# Patient Record
Sex: MALE | Race: WHITE | NOT HISPANIC OR LATINO | Employment: OTHER | URBAN - METROPOLITAN AREA
[De-identification: names, ages, dates, MRNs, and addresses within clinical notes are randomized per-mention and may not be internally consistent; named-entity substitution may affect disease eponyms.]

---
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
## 2018-01-10 NOTE — PROGRESS NOTES
Assessment    1  Well child visit (V20 2) (Z00 129)    Discussion/Summary    Impression:   No growth, development, elimination, feeding, skin and sleep concerns  no medical problems  Anticipatory guidance addressed as per the history of present illness section  No vaccines needed  No medication changes  Information discussed with patient and Parent/Guardian  History of Present Illness  GENNY, 12-18 years Male (Brief):   General Health: The child's health since the last visit is described as good  Dental hygiene: Good  Immunization status: Up to date  Caregiver concerns:   Caregivers deny concerns regarding nutrition, sleep, behavior, school, development and elimination  Nutrition/Elimination:   Diet:  his current diet is diverse and healthy  No elimination issues are expressed  Sleep:  No sleep issues are reported  Behavior: The child's temperament is described as calm and happy  No behavior issues identified  Health Risks:  No significant risk factors are identified  Childcare/School: The child stays home alone  He is in high school  School performance has been good  Sports Participation Questions: Active Problems    1  Acute pharyngitis (462) (J02 9)   2  Chest wall pain (786 52) (R07 89)   3  Functional heart murmur (R01 0)   4  Headache (784 0) (R51)   5  Late talker (783 42) (R62 0)   6  Orthostatic hypotension (458 0) (I95 1)   7  Wart (078 10) (B07 9)    Past Medical History    The past medical history was reviewed and updated today  Surgical History    The surgical history was reviewed and updated today  Social History    · Never a smoker  The social history was reviewed and updated today  The social history was reviewed and is unchanged  Current Meds   1  Benadryl 25 MG Oral Tablet; TAKE 2 TABLET Bedtime PRN; Therapy: 58TNI7243 to (Evaluate:16Jrq9913); Last Rx:74Wjz2049 Ordered   2  Ibuprofen 200 MG Oral Tablet; TAKE 2 TABLET 3 times daily PRN;    Therapy: 73FUC4206 to (Evaluate:05Rwv4679); Last Rx:53Mdz2110 Ordered   3  Vyvanse 40 MG Oral Capsule; Therapy: 69TDA3504 to (Evaluate:72Ygu5088) Recorded    The medication list was reviewed and updated today  Allergies    1  No Known Drug Allergies    Vitals   Recorded: 14Apr2016 03:11PM   Heart Rate 78   Respiration 16   Systolic 252   Diastolic 66   Height 5 ft 9 5 in   Weight 169 lb 6 08 oz   BMI Calculated 24 65   BSA Calculated 1 94     Physical Exam    Constitutional - General appearance: No acute distress, well appearing and well nourished  Eyes - Conjunctiva and lids: No injection, edema or discharge  Pupils and irises: Equal, round, reactive to light bilaterally  Ophthalmoscopic examination: Optic discs sharp  Ears, Nose, Mouth, and Throat - External inspection of ears and nose: Normal without deformities or discharge  Otoscopic examination: Tympanic membranes gray, translucent with good bony landmarks and light reflex  Canals patent without erythema  Hearing: Normal  Nasal mucosa, septum, and turbinates: Normal, no edema or discharge  Lips, teeth, and gums: Normal, good dentition  Oropharynx: Moist mucosa, normal tongue and tonsils without lesions  Neck - Neck: Supple, symmetric, no masses  Thyroid: No thyromegaly  Pulmonary - Respiratory effort: Normal respiratory rate and rhythm, no increased work of breathing  Percussion of chest: Normal  Palpation of chest: Normal  Auscultation of lungs: Clear bilaterally  Cardiovascular - Palpation of heart: Normal PMI, no thrill  Auscultation of heart: Regular rate and rhythm, normal S1 and S2, no murmur  Carotid pulses: Normal, 2+ bilaterally  Abdominal aorta: Normal  Femoral pulses: Normal, 2+ bilaterally  Pedal pulses: Normal, 2+ bilaterally  Examination of extremities for edema and/or varicosities: Normal    Chest - Breasts: Normal  Palpation of breasts and axillae: Normal    Abdomen - Abdomen: Normal bowel sounds, soft, non-tender, no masses   Liver and spleen: No hepatomegaly or splenomegaly  Examination for hernias: No hernias palpated  Genitourinary - Scrotal contents: Normal, no masses appreciated  Penis: Normal, no lesions  Lymphatic - Palpation of lymph nodes in neck: No anterior or posterior cervical lymphadenopathy  Palpation of lymph nodes in axillae: No lymphadenopathy  Palpation of lymph nodes in groin: No lymphadenopathy  Palpation of lymph nodes in other areas: No lymphadenopathy  Musculoskeletal - Gait and station: Normal gait  Digits and nails: Normal without clubbing or cyanosis  Inspection/palpation of joints, bones, and muscles: Normal  Evaluation for scoliosis: No scoliosis on exam  Range of motion: Normal  Stability: No joint instability  Muscle strength/tone: Normal    Skin - Skin and subcutaneous tissue: No rash or lesions  Palpation of skin and subcutaneous tissue: Normal    Neurologic - Cranial nerves: Normal  Reflexes: Normal  Sensation: Normal    Psychiatric - judgment and insight: Normal  Orientation to person, place, and time: Normal  Recent and remote memory: Normal  Mood and affect: Normal       Procedure    Procedure: Audiometry:   Hearing in the right ear: 20 decibals at 500 hertz, 20 decibals at 1000 hertz, 20 decibals at 2000 hertz and 20 decibals at 4000 hertz  Hearing in the left ear: 20 decibals at 500 hertz, 20 decibals at 1000 hertz, 20 decibals at 2000 hertz and 20 decibals at 4000 hertz  Procedure:   Results: 20/20 in both eyes without corrective device, 20/20 in the right eye without corrective device, 20/20 in the left eye without corrective device      Health Management  Health Maintenance   Pediatric / Adolescent Wellness Visit; every 1 year; Next Due: 28NAK2446;  Overdue    Signatures   Electronically signed by : MELANI Mckeon ; Apr 14 2016  3:53PM EST                       (Author)

## 2018-01-10 NOTE — PROGRESS NOTES
Assessment    1  Headache (784 0) (R51)    Plan  Headache    · Benadryl 25 MG Oral Tablet (DiphenhydrAMINE HCl); TAKE 2 TABLET Bedtime  PRN   · Ibuprofen 200 MG Oral Tablet; TAKE 2 TABLET 3 times daily PRN    Chief Complaint    1  Fever, > 36 months   2  Headache   3  Sore Throat    History of Present Illness  HPI: Has had some fever and ZHU  HA got better after ibuprofen  No specific sinus symptoms  Active Problems    1  Chest wall pain (786 52) (R07 89)   2  Functional heart murmur (R01 0)   3  Late talker (783 42) (R62 0)   4  Orthostatic hypotension (458 0) (I95 1)   5  Wart (078 10) (B07 9)    Social History    · Never a smoker    Current Meds   1  Vyvanse 40 MG Oral Capsule; Therapy: 09AYA4094 to (Evaluate:84Aeo6793) Recorded    Allergies    1  No Known Drug Allergies    Vitals   Recorded: 70YRB1897 02:30PM   Temperature 97 1 F   Heart Rate 80   Respiration 18   Systolic 807   Diastolic 68   Height 5 ft 9 in   Weight 175 lb    BMI Calculated 25 84   BSA Calculated 1 95     Physical Exam    Constitutional - General appearance: No acute distress, well appearing and well nourished  Eyes - Conjunctiva and lids: No injection, edema or discharge  Pupils and irises: Equal, round, reactive to light bilaterally  Ears, Nose, Mouth, and Throat - External inspection of ears and nose: Normal without deformities or discharge  Otoscopic examination: Tympanic membranes gray, translucent with good bony landmarks and light reflex  Canals patent without erythema  Nasal mucosa, septum, and turbinates: Abnormal  red nasal turbinates with PND  Oropharynx: Abnormal  PND, no exudate  Neck - Neck: Supple, symmetric, no masses  mild ACLA  Pulmonary - Auscultation of lungs: Clear bilaterally  Cardiovascular - Auscultation of heart: Regular rate and rhythm, normal S1 and S2, no murmur        Signatures   Electronically signed by : MELANI Coppola ; Feb  3 2016  3:03PM EST                       (Author)

## 2018-01-13 NOTE — MISCELLANEOUS
Message  Return to work or school:   Gabi Veloz is under my professional care   He was seen in my office on 2/3/16     He is able to return to school on 2/4/16 or 2/5/16          Signatures   Electronically signed by : Alvarez Barber, ; Feb  3 2016  3:05PM EST                       (Author)

## 2018-06-30 ENCOUNTER — HOSPITAL ENCOUNTER (EMERGENCY)
Facility: HOSPITAL | Age: 18
Discharge: HOME | End: 2018-06-30
Attending: EMERGENCY MEDICINE | Admitting: EMERGENCY MEDICINE
Payer: COMMERCIAL

## 2018-06-30 ENCOUNTER — APPOINTMENT (EMERGENCY)
Dept: RADIOLOGY | Facility: HOSPITAL | Age: 18
End: 2018-06-30
Payer: COMMERCIAL

## 2018-06-30 VITALS
HEIGHT: 72 IN | OXYGEN SATURATION: 98 % | BODY MASS INDEX: 24.01 KG/M2 | DIASTOLIC BLOOD PRESSURE: 74 MMHG | WEIGHT: 177.25 LBS | SYSTOLIC BLOOD PRESSURE: 147 MMHG | HEART RATE: 76 BPM | RESPIRATION RATE: 18 BRPM | TEMPERATURE: 97.9 F
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
## 2018-06-30 PROCEDURE — 90675 RABIES VACCINE IM: CPT | Performed by: PHYSICIAN ASSISTANT

## 2018-06-30 PROCEDURE — 99283 EMERGENCY DEPT VISIT LOW MDM: CPT

## 2018-06-30 PROCEDURE — 96372 THER/PROPH/DIAG INJ SC/IM: CPT

## 2018-06-30 PROCEDURE — 90471 IMMUNIZATION ADMIN: CPT

## 2018-06-30 PROCEDURE — 90375 RABIES IG IM/SC: CPT | Performed by: PHYSICIAN ASSISTANT

## 2018-06-30 PROCEDURE — 90472 IMMUNIZATION ADMIN EACH ADD: CPT

## 2018-06-30 PROCEDURE — 73140 X-RAY EXAM OF FINGER(S): CPT

## 2018-06-30 PROCEDURE — 90715 TDAP VACCINE 7 YRS/> IM: CPT | Performed by: PHYSICIAN ASSISTANT

## 2018-06-30 RX ORDER — IBUPROFEN 400 MG/1
400 TABLET ORAL EVERY 6 HOURS PRN
Status: DISCONTINUED | OUTPATIENT
Start: 2018-06-30 | End: 2018-06-30 | Stop reason: HOSPADM

## 2018-06-30 RX ORDER — AMOXICILLIN AND CLAVULANATE POTASSIUM 875; 125 MG/1; MG/1
1 TABLET, FILM COATED ORAL 2 TIMES DAILY
Qty: 20 TABLET | Refills: 0 | Status: SHIPPED | OUTPATIENT
Start: 2018-06-30 | End: 2018-07-10

## 2018-06-30 RX ADMIN — RABIES VIRUS STRAIN PM-1503-3M ANTIGEN (PROPIOLACTONE INACTIVATED) AND WATER 1 ML: KIT at 15:19

## 2018-06-30 RX ADMIN — RABIES IMMUNE GLOBULIN (HUMAN) 1605 UNITS: 150 INJECTION INTRAMUSCULAR at 15:21

## 2018-06-30 RX ADMIN — TETANUS TOXOID, REDUCED DIPHTHERIA TOXOID AND ACELLULAR PERTUSSIS VACCINE, ADSORBED 0.5 ML: 5; 2.5; 8; 8; 2.5 SUSPENSION INTRAMUSCULAR at 15:21

## 2018-06-30 RX ADMIN — IBUPROFEN 400 MG: 400 TABLET, FILM COATED ORAL at 14:39

## 2018-06-30 RX ADMIN — Medication 1 APPLICATION: at 13:41

## 2018-06-30 NOTE — ED NOTES
Nursing student assessments and treatments reviewed and approved by myself       Rasta Borrero RN  06/30/18 5950

## 2018-06-30 NOTE — ED PROVIDER NOTES
History  Chief Complaint   Patient presents with   [facility]     pt trying to catch ground hog and got bit in left hand ring finger, animal taken to vet for rabies testing     18-year-old male with no significant past history presents for evaluation a ground dog bite over the left hand ring finger that happened earlier today  Patient reports that he was trying to catch the ground ho which he did  Reports that the ground hog is with the  and has been sent out for rabies testing  Patient reports full range of motion of the hand, denies bleeding currently  Patient reports he is unsure if he is up-to-date on his tetanus vaccination  Patient also states that he catches many rodents in also catches multiple bats over the past few months  Pt has never had rabies prophylaxis in the past              None       Past Medical History:   Diagnosis Date    ADHD        Past Surgical History:   Procedure Laterality Date    HERNIA REPAIR         History reviewed  No pertinent family history  I have reviewed and agree with the history as documented  Social History   Substance Use Topics    Smoking status: Never Smoker    Smokeless tobacco: Never Used      Comment: pt uses vape pen    Alcohol use Yes      Comment: socially        Review of Systems   Constitutional: Negative for chills and fever  Gastrointestinal: Negative for nausea and vomiting  Musculoskeletal: Negative for myalgias  Skin: Positive for wound  Physical Exam  Physical Exam   Constitutional: He is oriented to person, place, and time  He appears well-developed and well-nourished  No distress  Cardiovascular: Normal rate and normal heart sounds  Pulmonary/Chest: Effort normal and breath sounds normal    Musculoskeletal: Normal range of motion  Left hand: He exhibits tenderness, laceration and swelling  He exhibits normal range of motion, no bony tenderness, normal capillary refill and no deformity   Normal sensation noted  Normal strength noted  Hands:  Minor swelling noted over finger  NVI, full AROM of left fingers and wrist     Neurological: He is alert and oriented to person, place, and time  Skin: Skin is warm  Capillary refill takes less than 2 seconds  He is not diaphoretic  No erythema  Psychiatric: He has a normal mood and affect  Vitals reviewed  Vital Signs  ED Triage Vitals [06/30/18 1210]   Temperature Pulse Respirations Blood Pressure SpO2   97 9 °F (36 6 °C) 76 18 147/74 98 %      Temp Source Heart Rate Source Patient Position - Orthostatic VS BP Location FiO2 (%)   Oral Monitor Sitting Left arm --      Pain Score       4           Vitals:    06/30/18 1210   BP: 147/74   Pulse: 76   Patient Position - Orthostatic VS: Sitting       Visual Acuity      ED Medications  Medications   LET gel 1 application (1 application Topical Given 6/30/18 1341)   rabies vaccine, human diploid (IMOVAX RABIES) IM injection 1 mL (1 mL Intramuscular Given 6/30/18 1519)   tetanus-diphtheria-acellular pertussis (BOOSTRIX) IM injection 0 5 mL (0 5 mL Intramuscular Given 6/30/18 1521)   rabies immune globulin, human (IMOGAM RABIES-HT) IM injection 1,605 Units (1,605 Units Infiltration Given 6/30/18 1521)       Diagnostic Studies  Results Reviewed     None                 XR finger fourth digit-ring LEFT   Final Result by Saul Jackson MD (06/30 1441)      No fracture  Workstation performed: [id number]                    Procedures  Procedures       Phone Contacts  ED Phone Contact    ED Course                               MDM  Number of Diagnoses or Management Options  Non-rat rodent bite:   Diagnosis management comments: 22 yo male presents for evaluation after a groundhog bite  Pt is well appearing, skin avulsion noted  Will apply surgicel and wrap  Due to the fact patient has been around bats will treat with rabies vaccination and have pt complete series   RTED precautions discussed if finger becomes infected  CritCare Time    Disposition  Final diagnoses:   Non-rat rodent bite     Time reflects when diagnosis was documented in both MDM as applicable and the Disposition within this note     Time User Action Codes Description Comment    6/30/2018  4:04 PM Naa Bradford Add [address] rodent bite       ED Disposition     ED Disposition Condition Comment    Discharge  Troy Newton discharge to home/self care  Condition at discharge: Good        Follow-up Information     Follow up With Specialties Details Why Contact Info Additional Information    [facility] Schedule an appointment as soon as possible for a visit Follow up for your rabies vaccine series  Today is day 0  Follow up on day 3, 7, and 14  [address] [address]  [phone] [place], [place], [address]          Discharge Medication List as of 6/30/2018  4:06 PM      START taking these medications    Details   amoxicillin-clavulanate (AUGMENTIN) 875-125 mg per tablet Take 1 tablet by mouth 2 (two) times a day for 10 days, Starting Sat 6/30/2018, Until Tue 7/10/2018, Print           No discharge procedures on file      ED Provider  Electronically Signed by           Roderick Emmanuel PA-C  07/01/18 4383

## 2018-06-30 NOTE — DISCHARGE INSTRUCTIONS
Animal Bite   WHAT YOU NEED TO KNOW:   What do I need to know about an animal bite? Animal bite injuries range from shallow cuts to deep, life-threatening wounds  An animal can cut or puncture the skin when it bites  Your skin may be torn from your body  Your skin may swell or bruise even if the bite does not break the skin  Animal bites occur more often on the hands, arms, legs, and face  Bites from dogs and cats are the most common injuries  What does my healthcare provider need to know about my animal bite? · What kind of animal bit you? Is the animal a pet? If so, are its vaccines updated? · When and where did the bite happen? Was the animal bothered by you or another person before it bit? Did the animal show any fear? · Can the animal be brought in to watch it for sickness or disease? · Has the wound been treated? If so, what did you use to treat it? · Do you have any health conditions? Do you currently take any medicines? When was your last tetanus shot? What tests may I need after an animal bite? Your healthcare provider will look at how big and deep the bite wounds are  He will ask if any area feels numb  Your healthcare provider will check how well you can move the bitten area  He will also check for signs of infection  You may also need the following:  · Blood tests and a sample of fluid or tissue from your wound  may show if you have an infection  · An x-ray  may show fractures or foreign objects in your wound  How is an animal bite treated? · Irrigation and debridement  may be needed to clean out your wound  Dead, damaged, or infected tissue may be cut away to help your wound heal     · Medicines:      ¨ Antibiotics  prevent or treat a bacterial infection  ¨ Prescription pain medicine  may be given  Ask how to take this medicine safely  ¨ A tetanus vaccine  may be needed to prevent tetanus   Tetanus is a life-threatening bacterial infection that affects the nerves and muscles  The bacteria can be spread through animal bites  ¨ A rabies vaccine  may be needed to prevent rabies  Rabies is a life-threatening viral infection  The virus can be spread through animal bites  · Stitches  may be needed if your wound is large and not infected  · Surgery  may be needed to repair deep injuries or severe wounds  What can I do to manage my symptoms? · Apply antibiotic ointment as directed  This helps prevent infection in minor skin wounds  It is available without a doctor's order  · Keep the wound clean and covered  Wash the wound every day with soap and water or germ-killing cleanser  Ask your healthcare provider about the kinds of bandages to use  · Apply ice on your wound  Ice helps decrease swelling and pain  Ice may also help prevent tissue damage  Use an ice pack, or put crushed ice in a plastic bag  Cover it with a towel and place it on your wound for 15 to 20 minutes every hour or as directed  · Elevate the wound area  Raise your wound above the level of your heart as often as you can  This will help decrease swelling and pain  Prop your wound on pillows or blankets to keep it elevated comfortably  What can I do to prevent an animal bite? · Learn to recognize the signs of a scared pet  Avoid quick, sudden movements  · Do not step between animals that are fighting  · Do not leave a pet alone with a young child  · Do not disturb an animal while it eats, sleeps, or cares for its young  · Do not approach an animal you do not know, especially one that is tied up or caged  · Stay away from animals that seem sick or act strangely  · Do not feed or capture wild animals  When should I seek immediate care? · You have a fever  · Your wound is red, swollen, and draining pus  · You see red streaks on the skin around the wound  · You can no longer move the bitten area      · Your heartbeat and breathing are much faster than usual     · You feel dizzy and confused  When should I contact my healthcare provider? · Your pain does not get better, even after you take pain medicine  · You have nightmares or flashbacks about the animal bite  · You have questions or concerns about your condition or care  CARE AGREEMENT:   You have the right to help plan your care  Learn about your health condition and how it may be treated  Discuss treatment options with your caregivers to decide what care you want to receive  You always have the right to refuse treatment  The above information is an  only  It is not intended as medical advice for individual conditions or treatments  Talk to your doctor, nurse or pharmacist before following any medical regimen to see if it is safe and effective for you  © 2017 [address] Information is for End User's use only and may not be sold, redistributed or otherwise used for commercial purposes  All illustrations and images included in CareNotes® are the copyrighted property of [employer]  or Prakash Stewart

## 2018-06-30 NOTE — ED NOTES
Pt provided with ice pack   Annamaria Frees PAC to bedside to dc pt and go over f/u immunizations     Jazmine Slade RN  06/30/18 0685

## 2018-07-03 ENCOUNTER — OFFICE VISIT (OUTPATIENT)
Dept: URGENT CARE | Facility: MEDICAL CENTER | Age: 18
End: 2018-07-03
Payer: COMMERCIAL

## 2018-07-03 VITALS — TEMPERATURE: 98 F
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
## 2018-07-03 DIAGNOSIS — Z23 ENCOUNTER FOR IMMUNIZATION: ICD-10-CM

## 2018-07-03 PROCEDURE — 90471 IMMUNIZATION ADMIN: CPT | Performed by: FAMILY MEDICINE

## 2018-07-03 PROCEDURE — 90675 RABIES VACCINE IM: CPT | Performed by: PHYSICIAN ASSISTANT

## 2018-07-07 ENCOUNTER — OFFICE VISIT (OUTPATIENT)
Dept: URGENT CARE | Facility: MEDICAL CENTER | Age: 18
End: 2018-07-07
Payer: COMMERCIAL

## 2018-07-07 VITALS — TEMPERATURE: 97.7 F

## 2018-07-07 DIAGNOSIS — Z20.3 RABIES EXPOSURE: Primary | ICD-10-CM

## 2018-07-07 PROCEDURE — 90675 RABIES VACCINE IM: CPT

## 2018-07-07 PROCEDURE — 90471 IMMUNIZATION ADMIN: CPT

## 2018-07-14 ENCOUNTER — CLINICAL SUPPORT (OUTPATIENT)
Dept: URGENT CARE | Facility: MEDICAL CENTER | Age: 18
End: 2018-07-14
Payer: COMMERCIAL

## 2018-07-14 DIAGNOSIS — Z23 NEED FOR RABIES VACCINATION: Primary | ICD-10-CM

## 2018-07-14 PROCEDURE — 90471 IMMUNIZATION ADMIN: CPT

## 2018-07-14 PROCEDURE — 99213 OFFICE O/P EST LOW 20 MIN: CPT

## 2018-07-14 PROCEDURE — 90675 RABIES VACCINE IM: CPT

## 2018-07-15 VITALS — TEMPERATURE: 98 F

## 2018-07-28 ENCOUNTER — APPOINTMENT (EMERGENCY)
Dept: CT IMAGING | Facility: HOSPITAL | Age: 18
End: 2018-07-28
Payer: COMMERCIAL

## 2018-07-28 ENCOUNTER — HOSPITAL ENCOUNTER (EMERGENCY)
Facility: HOSPITAL | Age: 18
Discharge: HOME | End: 2018-07-28
Attending: EMERGENCY MEDICINE | Admitting: EMERGENCY MEDICINE
Payer: COMMERCIAL

## 2018-07-28 ENCOUNTER — APPOINTMENT (EMERGENCY)
Dept: RADIOLOGY | Facility: HOSPITAL | Age: 18
End: 2018-07-28
Payer: COMMERCIAL

## 2018-07-28 VITALS
TEMPERATURE: 98.6 F | WEIGHT: 175 LBS | HEIGHT: 72 IN | HEART RATE: 86 BPM | RESPIRATION RATE: 18 BRPM | SYSTOLIC BLOOD PRESSURE: 134 MMHG | DIASTOLIC BLOOD PRESSURE: 67 MMHG | BODY MASS INDEX: 23.7 KG/M2 | OXYGEN SATURATION: 99 %

## 2018-07-28 DIAGNOSIS — S39.91XA BLUNT TRAUMA TO ABDOMEN, INITIAL ENCOUNTER: ICD-10-CM

## 2018-07-28 DIAGNOSIS — S82.899A ANKLE FRACTURE: Primary | ICD-10-CM

## 2018-07-28 LAB
ABO GROUP BLD: NORMAL
ANION GAP SERPL CALCULATED.3IONS-SCNC: 10 MMOL/L (ref 4–13)
APTT PPP: 32 SECONDS (ref 24–36)
BASOPHILS # BLD AUTO: 0.03 THOUSANDS/ΜL (ref 0–0.1)
BASOPHILS NFR BLD AUTO: 0 % (ref 0–1)
BLD GP AB SCN SERPL QL: NEGATIVE
BUN SERPL-MCNC: 13 MG/DL (ref 5–25)
CALCIUM SERPL-MCNC: 9.4 MG/DL (ref 8.3–10.1)
CHLORIDE SERPL-SCNC: 102 MMOL/L (ref 100–108)
CO2 SERPL-SCNC: 28 MMOL/L (ref 21–32)
CREAT SERPL-MCNC: 1.08 MG/DL (ref 0.6–1.3)
EOSINOPHIL # BLD AUTO: 0.05 THOUSAND/ΜL (ref 0–0.61)
EOSINOPHIL NFR BLD AUTO: 1 % (ref 0–6)
ERYTHROCYTE [DISTWIDTH] IN BLOOD BY AUTOMATED COUNT: 12.2 % (ref 11.6–15.1)
GFR SERPL CREATININE-BSD FRML MDRD: 99 ML/MIN/1.73SQ M
GLUCOSE SERPL-MCNC: 124 MG/DL (ref 65–140)
HCT VFR BLD AUTO: 44.1 % (ref 36.5–49.3)
HGB BLD-MCNC: 15.4 G/DL (ref 12–17)
INR PPP: 0.95 (ref 0.86–1.17)
LYMPHOCYTES # BLD AUTO: 2.22 THOUSANDS/ΜL (ref 0.6–4.47)
LYMPHOCYTES NFR BLD AUTO: 21 % (ref 14–44)
MCH RBC QN AUTO: 29.5 PG (ref 26.8–34.3)
MCHC RBC AUTO-ENTMCNC: 34.9 G/DL (ref 31.4–37.4)
MCV RBC AUTO: 85 FL (ref 82–98)
MONOCYTES # BLD AUTO: 0.78 THOUSAND/ΜL (ref 0.17–1.22)
MONOCYTES NFR BLD AUTO: 7 % (ref 4–12)
NEUTROPHILS # BLD AUTO: 7.46 THOUSANDS/ΜL (ref 1.85–7.62)
NEUTS SEG NFR BLD AUTO: 71 % (ref 43–75)
PLATELET # BLD AUTO: 231 THOUSANDS/UL (ref 149–390)
PMV BLD AUTO: 10 FL (ref 8.9–12.7)
POTASSIUM SERPL-SCNC: 3.7 MMOL/L (ref 3.5–5.3)
PROTHROMBIN TIME: 12.4 SECONDS (ref 11.8–14.2)
RBC # BLD AUTO: 5.22 MILLION/UL (ref 3.88–5.62)
RH BLD: POSITIVE
SODIUM SERPL-SCNC: 140 MMOL/L (ref 136–145)
SPECIMEN EXPIRATION DATE: NORMAL
WBC # BLD AUTO: 10.54 THOUSAND/UL (ref 4.31–10.16)

## 2018-07-28 PROCEDURE — 96361 HYDRATE IV INFUSION ADD-ON: CPT

## 2018-07-28 PROCEDURE — 96376 TX/PRO/DX INJ SAME DRUG ADON: CPT

## 2018-07-28 PROCEDURE — 99285 EMERGENCY DEPT VISIT HI MDM: CPT

## 2018-07-28 PROCEDURE — 36415 COLL VENOUS BLD VENIPUNCTURE: CPT | Performed by: PHYSICIAN ASSISTANT

## 2018-07-28 PROCEDURE — 86900 BLOOD TYPING SEROLOGIC ABO: CPT | Performed by: PHYSICIAN ASSISTANT

## 2018-07-28 PROCEDURE — 85025 COMPLETE CBC W/AUTO DIFF WBC: CPT | Performed by: PHYSICIAN ASSISTANT

## 2018-07-28 PROCEDURE — 85730 THROMBOPLASTIN TIME PARTIAL: CPT | Performed by: PHYSICIAN ASSISTANT

## 2018-07-28 PROCEDURE — 73610 X-RAY EXAM OF ANKLE: CPT

## 2018-07-28 PROCEDURE — 74177 CT ABD & PELVIS W/CONTRAST: CPT

## 2018-07-28 PROCEDURE — 86901 BLOOD TYPING SEROLOGIC RH(D): CPT | Performed by: PHYSICIAN ASSISTANT

## 2018-07-28 PROCEDURE — 80048 BASIC METABOLIC PNL TOTAL CA: CPT | Performed by: PHYSICIAN ASSISTANT

## 2018-07-28 PROCEDURE — 96374 THER/PROPH/DIAG INJ IV PUSH: CPT

## 2018-07-28 PROCEDURE — 85610 PROTHROMBIN TIME: CPT | Performed by: PHYSICIAN ASSISTANT

## 2018-07-28 PROCEDURE — 86850 RBC ANTIBODY SCREEN: CPT | Performed by: PHYSICIAN ASSISTANT

## 2018-07-28 PROCEDURE — 96375 TX/PRO/DX INJ NEW DRUG ADDON: CPT

## 2018-07-28 RX ORDER — MIDAZOLAM HYDROCHLORIDE 1 MG/ML
INJECTION INTRAMUSCULAR; INTRAVENOUS
Status: COMPLETED
Start: 2018-07-28 | End: 2018-07-28

## 2018-07-28 RX ORDER — MORPHINE SULFATE 10 MG/ML
6 INJECTION, SOLUTION INTRAMUSCULAR; INTRAVENOUS ONCE
Status: COMPLETED | OUTPATIENT
Start: 2018-07-28 | End: 2018-07-28

## 2018-07-28 RX ORDER — ONDANSETRON 2 MG/ML
4 INJECTION INTRAMUSCULAR; INTRAVENOUS ONCE
Status: COMPLETED | OUTPATIENT
Start: 2018-07-28 | End: 2018-07-28

## 2018-07-28 RX ORDER — FENTANYL CITRATE 50 UG/ML
50 INJECTION, SOLUTION INTRAMUSCULAR; INTRAVENOUS ONCE
Status: COMPLETED | OUTPATIENT
Start: 2018-07-28 | End: 2018-07-28

## 2018-07-28 RX ORDER — IBUPROFEN 600 MG/1
600 TABLET ORAL EVERY 6 HOURS PRN
Qty: 15 TABLET | Refills: 0 | Status: SHIPPED | OUTPATIENT
Start: 2018-07-28 | End: 2018-08-12

## 2018-07-28 RX ORDER — OXYCODONE HYDROCHLORIDE AND ACETAMINOPHEN 5; 325 MG/1; MG/1
1 TABLET ORAL EVERY 6 HOURS PRN
Qty: 12 TABLET | Refills: 0 | Status: SHIPPED | OUTPATIENT
Start: 2018-07-28 | End: 2018-08-09

## 2018-07-28 RX ORDER — PROPOFOL 10 MG/ML
50 INJECTION, EMULSION INTRAVENOUS ONCE
Status: COMPLETED | OUTPATIENT
Start: 2018-07-28 | End: 2018-07-28

## 2018-07-28 RX ORDER — MORPHINE SULFATE 10 MG/ML
8 INJECTION, SOLUTION INTRAMUSCULAR; INTRAVENOUS ONCE
Status: COMPLETED | OUTPATIENT
Start: 2018-07-28 | End: 2018-07-28

## 2018-07-28 RX ORDER — GINSENG 100 MG
1 CAPSULE ORAL ONCE
Status: COMPLETED | OUTPATIENT
Start: 2018-07-28 | End: 2018-07-28

## 2018-07-28 RX ORDER — FENTANYL CITRATE 50 UG/ML
INJECTION, SOLUTION INTRAMUSCULAR; INTRAVENOUS
Status: COMPLETED
Start: 2018-07-28 | End: 2018-07-28

## 2018-07-28 RX ADMIN — ONDANSETRON 4 MG: 2 INJECTION INTRAMUSCULAR; INTRAVENOUS at 18:28

## 2018-07-28 RX ADMIN — FENTANYL CITRATE 100 MCG: 50 INJECTION INTRAMUSCULAR; INTRAVENOUS at 19:44

## 2018-07-28 RX ADMIN — MORPHINE SULFATE 8 MG: 10 INJECTION INTRAVENOUS at 18:28

## 2018-07-28 RX ADMIN — IOHEXOL 100 ML: 350 INJECTION, SOLUTION INTRAVENOUS at 19:04

## 2018-07-28 RX ADMIN — MIDAZOLAM HYDROCHLORIDE 2 MG: 1 INJECTION, SOLUTION INTRAMUSCULAR; INTRAVENOUS at 19:49

## 2018-07-28 RX ADMIN — PROPOFOL 100 MG: 10 INJECTION, EMULSION INTRAVENOUS at 19:38

## 2018-07-28 RX ADMIN — FENTANYL CITRATE 100 MCG: 50 INJECTION, SOLUTION INTRAMUSCULAR; INTRAVENOUS at 19:47

## 2018-07-28 RX ADMIN — MORPHINE SULFATE 6 MG: 10 INJECTION INTRAVENOUS at 19:12

## 2018-07-28 RX ADMIN — BACITRACIN ZINC 1 LARGE APPLICATION: 500 OINTMENT TOPICAL at 20:54

## 2018-07-28 RX ADMIN — SODIUM CHLORIDE 1000 ML: 0.9 INJECTION, SOLUTION INTRAVENOUS at 18:28

## 2018-07-29 NOTE — DISCHARGE INSTRUCTIONS
Ankle Fracture   WHAT YOU NEED TO KNOW:   An ankle fracture is a break in 1 or more of the bones in your ankle  DISCHARGE INSTRUCTIONS:   Call 911 for any of the following:   · You feel lightheaded, short of breath, and have chest pain  · You cough up blood  Return to the emergency department if:   · Your leg feels warm, tender, and painful  It may look swollen and red  · Blood soaks through your bandage  · You have severe pain in your ankle  · Your cast feels too tight  · Your foot or toes are cold or numb  · Your foot or toenails turn blue or gray  Contact your healthcare provider if:   · Your splint feels too tight  · Your swelling has increased or returned  · You have a fever  · Your pain does not go away, even after treatment  · You have questions or concerns about your condition or care  Medicines: You may need any of the following:  · Acetaminophen  decreases pain and fever  It is available without a doctor's order  Ask how much to take and how often to take it  Follow directions  Acetaminophen can cause liver damage if not taken correctly  · NSAIDs , such as ibuprofen, help decrease swelling, pain, and fever  This medicine is available with or without a doctor's order  NSAIDs can cause stomach bleeding or kidney problems in certain people  If you take blood thinner medicine, always ask your healthcare provider if NSAIDs are safe for you  Always read the medicine label and follow directions  · Prescription pain medicine  may be given  Ask your healthcare provider how to take this medicine safely  · Take your medicine as directed  Contact your healthcare provider if you think your medicine is not helping or if you have side effects  Tell him or her if you are allergic to any medicine  Keep a list of the medicines, vitamins, and herbs you take  Include the amounts, and when and why you take them  Bring the list or the pill bottles to follow-up visits   Carry your medicine list with you in case of an emergency  Follow up with your healthcare provider in 1 to 2 days: Your fracture may need to be reduced (bones pushed back into place) or you may need surgery  Write down your questions so you remember to ask them during your visits  Support devices: You will be given a brace, cast, or splint to limit your movement and protect your ankle  You may need to use crutches to protect your ankle and decrease your pain as you move around  Do not remove your device and do not put weight on your injured ankle  Splint and cast care:  Cover the splint or cast before you bathe so it does not get wet  Tape 2 plastic trash bags to your skin above the cast  Try to keep your ankle out of the water as much as possible  Rest:  Rest your ankle so that it can heal  Return to normal activities as directed  Ice:  Apply ice on your ankle for 15 to 20 minutes every hour or as directed  Use an ice pack, or put crushed ice in a plastic bag  Cover it with a towel  Ice helps prevent tissue damage and decreases swelling and pain  Elevate:  Elevate your ankle above the level of your heart as often as you can  This will help decrease swelling and pain  Prop your ankle on pillows or blankets to keep it elevated comfortably  © 2017 [facility] Information is for End User's use only and may not be sold, redistributed or otherwise used for commercial purposes  All illustrations and images included in CareNotes® are the copyrighted property of [employer]  or Prakash Stewart  The above information is an  only  It is not intended as medical advice for individual conditions or treatments  Talk to your doctor, nurse or pharmacist before following any medical regimen to see if it is safe and effective for you  Ankle Fracture   WHAT YOU NEED TO KNOW:   An ankle fracture is a break in 1 or more of the bones in your ankle    DISCHARGE INSTRUCTIONS:   Call 911 for any of the following:   · You feel lightheaded, short of breath, and have chest pain  · You cough up blood  Return to the emergency department if:   · Your leg feels warm, tender, and painful  It may look swollen and red  · Blood soaks through your bandage  · You have severe pain in your ankle  · Your cast feels too tight  · Your foot or toes are cold or numb  · Your foot or toenails turn blue or gray  Contact your healthcare provider if:   · Your splint feels too tight  · Your swelling has increased or returned  · You have a fever  · Your pain does not go away, even after treatment  · You have questions or concerns about your condition or care  Medicines: You may need any of the following:  · Acetaminophen  decreases pain and fever  It is available without a doctor's order  Ask how much to take and how often to take it  Follow directions  Acetaminophen can cause liver damage if not taken correctly  · NSAIDs , such as ibuprofen, help decrease swelling, pain, and fever  This medicine is available with or without a doctor's order  NSAIDs can cause stomach bleeding or kidney problems in certain people  If you take blood thinner medicine, always ask your healthcare provider if NSAIDs are safe for you  Always read the medicine label and follow directions  · Prescription pain medicine  may be given  Ask your healthcare provider how to take this medicine safely  · Take your medicine as directed  Contact your healthcare provider if you think your medicine is not helping or if you have side effects  Tell him or her if you are allergic to any medicine  Keep a list of the medicines, vitamins, and herbs you take  Include the amounts, and when and why you take them  Bring the list or the pill bottles to follow-up visits  Carry your medicine list with you in case of an emergency  Follow up with your healthcare provider in 1 to 2 days:   Your fracture may need to be reduced (bones pushed back into place) or you may need surgery  Write down your questions so you remember to ask them during your visits  Support devices: You will be given a brace, cast, or splint to limit your movement and protect your ankle  You may need to use crutches to protect your ankle and decrease your pain as you move around  Do not remove your device and do not put weight on your injured ankle  Splint and cast care:  Cover the splint or cast before you bathe so it does not get wet  Tape 2 plastic trash bags to your skin above the cast  Try to keep your ankle out of the water as much as possible  Rest:  Rest your ankle so that it can heal  Return to normal activities as directed  Ice:  Apply ice on your ankle for 15 to 20 minutes every hour or as directed  Use an ice pack, or put crushed ice in a plastic bag  Cover it with a towel  Ice helps prevent tissue damage and decreases swelling and pain  Elevate:  Elevate your ankle above the level of your heart as often as you can  This will help decrease swelling and pain  Prop your ankle on pillows or blankets to keep it elevated comfortably  © 2017 [address] [facility] Information is for End User's use only and may not be sold, redistributed or otherwise used for commercial purposes  All illustrations and images included in CareNotes® are the copyrighted property of [employer]  or Prakash Stewart  The above information is an  only  It is not intended as medical advice for individual conditions or treatments  Talk to your doctor, nurse or pharmacist before following any medical regimen to see if it is safe and effective for you

## 2018-07-29 NOTE — ED NOTES
Right elbow and right knee wounds irrigated and scrubbed with hibiclens and saline  Wounds dressed with bacitracin and nonadherant dressings and secured with rachell Ventura RN  07/28/18 2046

## 2018-07-29 NOTE — ED PROCEDURE NOTE
PROCEDURE  Procedural Sedation  Date/Time: 7/28/2018 7:38 PM  Performed by: Byron Villarreal  Authorized by: Byron Villarreal     Consent:     Consent obtained:  Written    Consent given by:  Patient    Risks discussed:   Allergic reaction, dysrhythmia, inadequate sedation, nausea, vomiting, prolonged sedation necessitating reversal, prolonged hypoxia resulting in organ damage and respiratory compromise necessitating ventilatory assistance and intubation    Alternatives discussed:  Analgesia without sedation  Universal protocol:     Procedure explained and questions answered to patient or proxy's satisfaction: yes      Patient identity confirmation method:  Verbally with patient  Indications:     Sedation purpose:  Dislocation reduction    Procedure necessitating sedation performed by:  Different physician    Intended level of sedation:  Moderate (conscious sedation)  Pre-sedation assessment:     NPO status caution: urgency dictates proceeding with non-ideal NPO status      ASA classification: class 1 - normal, healthy patient      Neck mobility: normal      Mouth opening:  3 or more finger widths    Thyromental distance:  4 finger widths    Mallampati score:  I - soft palate, uvula, fauces, pillars visible    Pre-sedation assessments completed and reviewed: airway patency, cardiovascular function, hydration status, mental status, nausea/vomiting, pain level, respiratory function and temperature      History of difficult intubation: no    Immediate pre-procedure details:     Reviewed: vital signs, relevant labs/tests and NPO status      Verified: bag valve mask available, emergency equipment available, intubation equipment available, IV patency confirmed, oxygen available, reversal medications available and suction available    Procedure details (see MAR for exact dosages):     Preoxygenation:  Nasal cannula    Sedation:  Propofol    Analgesia:  Fentanyl    Intra-procedure monitoring:  Blood pressure monitoring, cardiac monitor, continuous capnometry, continuous pulse oximetry, frequent LOC assessments and frequent vital sign checks    Intra-procedure events: none    Post-procedure details:     Post-sedation assessments completed and reviewed: airway patency, cardiovascular function, hydration status, mental status, nausea/vomiting, pain level, respiratory function and temperature      Patient tolerance:   Tolerated well, no immediate complications         Edmundo Kelley DO  07/28/18 2009

## 2018-07-29 NOTE — ED NOTES
Pt  Tolerated procedure well and reports that his pain is vastly improved  Vitals stable  Family at bedside        Krzysztof Locke RN  07/28/18 2017

## 2018-07-30 ENCOUNTER — OFFICE VISIT (OUTPATIENT)
Dept: OBGYN CLINIC | Facility: CLINIC | Age: 18
End: 2018-07-30
Payer: COMMERCIAL

## 2018-07-30 ENCOUNTER — APPOINTMENT (OUTPATIENT)
Dept: RADIOLOGY | Facility: CLINIC | Age: 18
End: 2018-07-30
Payer: COMMERCIAL

## 2018-07-30 VITALS
BODY MASS INDEX: 23.7 KG/M2 | SYSTOLIC BLOOD PRESSURE: 119 MMHG | HEART RATE: 76 BPM | WEIGHT: 175 LBS | HEIGHT: 72 IN | DIASTOLIC BLOOD PRESSURE: 72 MMHG

## 2018-07-30 DIAGNOSIS — M25.571 PAIN, JOINT, ANKLE AND FOOT, RIGHT: Primary | ICD-10-CM

## 2018-07-30 DIAGNOSIS — S82.851A TRIMALLEOLAR FRACTURE OF ANKLE, CLOSED, RIGHT, INITIAL ENCOUNTER: ICD-10-CM

## 2018-07-30 DIAGNOSIS — M25.571 PAIN, JOINT, ANKLE AND FOOT, RIGHT: ICD-10-CM

## 2018-07-30 PROCEDURE — 99204 OFFICE O/P NEW MOD 45 MIN: CPT | Performed by: ORTHOPAEDIC SURGERY

## 2018-07-30 PROCEDURE — 73610 X-RAY EXAM OF ANKLE: CPT

## 2018-07-30 NOTE — PROGRESS NOTES
Assessment:  1  Pain, joint, ankle and foot, right  XR ankle 3+ vw right     There is no problem list on file for this patient  Plan       continue with non operative care follow-up next week repeat the x-rays on the ankle and if stable we will put him in a short-leg nonweightbearing cast Black as the color of choice            Subjective:     Patient ID:    Chief Complaint:Ministerio Pruitt 21 y o  male      HPI    Patient comes in today with regards to Right ankle  The patient reports that the pain is in the  Right ankle and has been going on for  2 days  The pain is rated at7 at its best and10 at its worst   The pain is described as  Sharp and throbbing  It is worsened with  Sitting with the leg in a dependent position, and is made better with  Elevating it  The patient has taken  Ibuprofen and oxycodone for treatment  The following portions of the patient's history were reviewed and updated as appropriate: allergies, current medications, past family history, past social history, past surgical history and problem list         Social History     Social History    Marital status: Single     Spouse name: N/A    Number of children: N/A    Years of education: N/A     Occupational History    Not on file       Social History Main Topics    Smoking status: Never Smoker    Smokeless tobacco: Never Used      Comment: pt uses vape pen    Alcohol use Yes      Comment: socially    Drug use: No    Sexual activity: Not on file     Other Topics Concern    Not on file     Social History Narrative    No narrative on file     Past Medical History:   Diagnosis Date    ADHD      Past Surgical History:   Procedure Laterality Date    HERNIA REPAIR       No Known Allergies  Current Outpatient Prescriptions on File Prior to Visit   Medication Sig Dispense Refill    ibuprofen (MOTRIN) 600 mg tablet Take 1 tablet (600 mg total) by mouth every 6 (six) hours as needed for mild pain for up to 15 days 15 tablet 0    oxyCODONE-acetaminophen (PERCOCET) 5-325 mg per tablet Take 1 tablet by mouth every 6 (six) hours as needed for moderate pain for up to 12 days Max Daily Amount: 4 tablets 12 tablet 0     No current facility-administered medications on file prior to visit  Objective:    Review of Systems   Constitutional: Negative  HENT: Negative  Eyes: Negative  Respiratory: Negative  Cardiovascular: Negative  Gastrointestinal: Negative  Negative for vomiting  Genitourinary: Negative  Musculoskeletal:        Please refer to HPI   Skin: Negative  Neurological: Negative  Hematological: Negative  Psychiatric/Behavioral: Negative  All other systems reviewed and are negative  Right Ankle Exam   Other   Erythema: absent  Sensation: normal  Pulse: present     Comments:   Capillary refill less than 3 seconds negative wrinkle sign over the dorsum of the foot      Left Ankle Exam   Left ankle exam is normal     Range of Motion   Dorsiflexion: normal   Plantar flexion: normal   Inversion: normal   Eversion: normal     Muscle Strength   Dorsiflexion:  5/5   Plantar flexion:  5/5             Physical Exam   Constitutional: He is oriented to person, place, and time  He appears well-developed  HENT:   Head: Normocephalic  Eyes: Pupils are equal, round, and reactive to light  Neck: Neck supple  Cardiovascular: Intact distal pulses  Pulmonary/Chest: Effort normal    Abdominal: He exhibits no distension  Neurological: He is alert and oriented to person, place, and time  Skin: Skin is warm  Abrasions on the right elbow right knee all over the abdomen   Psychiatric: He has a normal mood and affect  Nursing note and vitals reviewed        Procedures  No Procedures performed today    I have personally reviewed pertinent films in PACS and my interpretation is Trimalleolar ankle fracture with small avulsion fracture off the posterior malleolus less than 25% of the articular surface the fibula fracture is a Aguilar a type of fracture and the medial malleolus is a small avulsion type fracture  Portions of the record may have been created with voice recognition software   Occasional wrong word or "sound a like" substitutions may have occurred due to the inherent limitations of voice recognition software   Read the chart carefully and recognize, using context, where substitutions have occurred

## 2018-08-02 ENCOUNTER — TELEPHONE (OUTPATIENT)
Dept: OBGYN CLINIC | Facility: HOSPITAL | Age: 18
End: 2018-08-02

## 2018-08-02 NOTE — TELEPHONE ENCOUNTER
This is nornal  Do not take ace off completely otherwise you risk displacement of fracture  picture is not necessarry

## 2018-08-02 NOTE — TELEPHONE ENCOUNTER
Mom is concerned about a blood clot occurring due to non-weight-bearing status  Has a family history and this is in the back of her mind  Should he be taking an ASA daily to guard against DVT  I advised patient that Dr  May hold this due to the bruising until seen on Tuesday for casting  Patient is taking Ibuprofen 600mg TID with food as well  Please advise

## 2018-08-02 NOTE — TELEPHONE ENCOUNTER
I just forwarded you the picture that just came thru  Let me know just incase  Mom was worried about infection  I advised her that this is normal and that you were okay with keeping the appt for Tuesday  Let me know what you think

## 2018-08-02 NOTE — TELEPHONE ENCOUNTER
You may want to look into fracture blister just so you are more familiar with them incase other patients call in the future

## 2018-08-02 NOTE — TELEPHONE ENCOUNTER
I will address at appt no need to keep tasking me if he is coming in today  Stay away from Marva Gagnon

## 2018-08-02 NOTE — TELEPHONE ENCOUNTER
Mom is unable to get picture to go thru  She would like Dr Kev Wilkinson to take a look at the ankle due to changes    Appt scheduled for [place] at 10:30am

## 2018-08-02 NOTE — TELEPHONE ENCOUNTER
Patient's mother was told no need for appt today as the symptoms were 100 percent normal   They will continue to monitor the symptoms and call if there is any worsening  They are to ice 20 min on 20 min off, do not remove the ace complete due to possible displacement of fx, ibuprofen 600mg TID, percocet as directed  If blister opens up they are to apply a dry sterile dressing and call office for instructions  They will keep 8/7/18 appt  Thank you

## 2018-08-02 NOTE — TELEPHONE ENCOUNTER
Dr Luis Bonilla patient - Ankle fx    Patient's mom calling that  Patient gerardo something funny so he opened the ace wrap and noticed that he has developed a blood blister near the ankle and above the blister it look like it wants to break open  I advised her to have patient ice and elevate and reapply the ace wrap  Mom is sending a picture to my email  I will forward to Dr Luis Bonilla for review when it arrives

## 2018-08-02 NOTE — TELEPHONE ENCOUNTER
Was this a new appt bc of this blister or was that scheduled to have cast applied? If it is new then no   If it is for cast and still sig swollen then no push it off till next week bc cast wont't be able to be put on

## 2018-08-03 ENCOUNTER — TELEPHONE (OUTPATIENT)
Dept: OBGYN CLINIC | Facility: HOSPITAL | Age: 18
End: 2018-08-03

## 2018-08-03 NOTE — TELEPHONE ENCOUNTER
Dr Lela Oakley patient - Ankle fx    Patient's mother called that patient is running a low grade temp of 100  I advised her to continue with tylenol 1000mg TID and ibuprofen 600mg TID with food, ice and elevation  Toes have decreased in swelling  Call if temp goes above 101, increased swelling, or increased pain  Mom verbalized understanding

## 2018-08-06 NOTE — TELEPHONE ENCOUNTER
Patient's mother called back to let us know that patient is running a low grade temp at night, highest 100 6, still has the blood blister and is still having pain  They are requesting refill on the Ibuprofen 600mg RX sent to pharmacy on file  They have an appt tomorrow but are almost out of the medication  I advised okay to take ibuprofen OTC 200mg tab 3 tabs = 600mg in the meantime  Verbalized understanding

## 2018-08-07 ENCOUNTER — APPOINTMENT (OUTPATIENT)
Dept: RADIOLOGY | Facility: CLINIC | Age: 18
End: 2018-08-07
Payer: COMMERCIAL

## 2018-08-07 ENCOUNTER — OFFICE VISIT (OUTPATIENT)
Dept: OBGYN CLINIC | Facility: CLINIC | Age: 18
End: 2018-08-07

## 2018-08-07 VITALS — DIASTOLIC BLOOD PRESSURE: 76 MMHG | HEART RATE: 106 BPM | HEIGHT: 72 IN | SYSTOLIC BLOOD PRESSURE: 116 MMHG

## 2018-08-07 DIAGNOSIS — M25.571 PAIN, JOINT, ANKLE AND FOOT, RIGHT: ICD-10-CM

## 2018-08-07 DIAGNOSIS — S82.851A TRIMALLEOLAR FRACTURE OF ANKLE, CLOSED, RIGHT, INITIAL ENCOUNTER: Primary | ICD-10-CM

## 2018-08-07 PROCEDURE — 99024 POSTOP FOLLOW-UP VISIT: CPT | Performed by: ORTHOPAEDIC SURGERY

## 2018-08-07 PROCEDURE — 73610 X-RAY EXAM OF ANKLE: CPT

## 2018-08-07 RX ORDER — METHOCARBAMOL 500 MG/1
TABLET, FILM COATED ORAL
COMMUNITY
Start: 2018-05-01 | End: 2018-10-18 | Stop reason: ALTCHOICE

## 2018-08-07 RX ORDER — ACETAMINOPHEN AND CODEINE PHOSPHATE 300; 30 MG/1; MG/1
1 TABLET ORAL EVERY 6 HOURS PRN
Qty: 30 TABLET | Refills: 0 | Status: SHIPPED | OUTPATIENT
Start: 2018-08-07 | End: 2018-10-18 | Stop reason: ALTCHOICE

## 2018-08-07 RX ORDER — LISDEXAMFETAMINE DIMESYLATE 40 MG/1
CAPSULE ORAL
Refills: 0 | COMMUNITY
Start: 2018-07-08

## 2018-08-07 NOTE — PROGRESS NOTES
Patient Name:  Mik Echols  MRN:  [id number]    Assessment & Plan    Right ankle fracture 7/28/18  1  Patient's right ankle remains too swollen to be placed in a cast   2  Splint reapplied in the office today  3  Continue nonweightbearing right lower extremity  4  Follow-up in one week  With Dr Tamela Lane for repeat evaluation and possible casting at that time      Subjective    58-year-old male returns to the office today for follow-up regarding his Right ankle fracture 7/28/18  Today he denies any increase in his pain  Notes overall improvement  He still notes swelling  He denies numbness and tingling  He has been compliant with the splint and nonweightbearing status  No fevers or chills  Objective    /76   Pulse (!) 106   Ht 6' (1 829 m)     Right ankle:  No gross deformity  Skin intact  Considerable soft tissue swelling noted about the foot and ankle  Fracture blisters noted laterally which are intact without drainage  No significant tenderness to palpation medial and lateral malleoli  Toes warm and mobile  Sensation intact right lower extremity  Palpable DP pulse  Data Review    I have personally reviewed pertinent films in PACS, and my interpretation follows  X-rays performed today of the right ankle reveals no interval displacement or angulation about the fracture  Ankle mortise remains intact

## 2018-08-15 ENCOUNTER — OFFICE VISIT (OUTPATIENT)
Dept: OBGYN CLINIC | Facility: CLINIC | Age: 18
End: 2018-08-15
Payer: COMMERCIAL

## 2018-08-15 ENCOUNTER — APPOINTMENT (OUTPATIENT)
Dept: RADIOLOGY | Facility: CLINIC | Age: 18
End: 2018-08-15
Payer: COMMERCIAL

## 2018-08-15 VITALS — DIASTOLIC BLOOD PRESSURE: 75 MMHG | SYSTOLIC BLOOD PRESSURE: 125 MMHG | HEART RATE: 82 BPM

## 2018-08-15 DIAGNOSIS — M25.571 PAIN, JOINT, ANKLE AND FOOT, RIGHT: ICD-10-CM

## 2018-08-15 DIAGNOSIS — S82.851D CLOSED TRIMALLEOLAR FRACTURE OF RIGHT ANKLE WITH ROUTINE HEALING, SUBSEQUENT ENCOUNTER: Primary | ICD-10-CM

## 2018-08-15 PROCEDURE — 73610 X-RAY EXAM OF ANKLE: CPT

## 2018-08-15 PROCEDURE — 99024 POSTOP FOLLOW-UP VISIT: CPT | Performed by: ORTHOPAEDIC SURGERY

## 2018-08-15 PROCEDURE — 29405 APPL SHORT LEG CAST: CPT | Performed by: ORTHOPAEDIC SURGERY

## 2018-08-15 NOTE — PROGRESS NOTES
Assessment:  1  Closed trimalleolar fracture of right ankle with routine healing, subsequent encounter  Cast application    Ambulatory referral to Orthopedic Surgery   2  Pain, joint, ankle and foot, right  XR ankle 3+ vw right    Cast application    Ambulatory referral to Orthopedic Surgery       Plan:  X-rays show healing tri-mal fracture and with his reduction in his swelling, will place him into a short leg cast  Remain non-weight bearing  Elevate and cast care instructions advised    To do next visit:  Return in about 3 weeks (around 9/5/2018) for re-check with Dr Samuel Simon,  x-rays out of his cast        Scribe Attestation    I,:   Alvarez Fuentes am acting as a scribe while in the presence of the attending physician :        I,:   Clem Ashford MD personally performed the services described in this documentation    as scribed in my presence :              Subjective:   Lorelei Mathew is a 21 y o  male who presents with his father for re-evaluation of his right tri-malleolar ankle fracture, DOI was 7/28/18  He has been treated thus far in a splint  Denies any increased pain  He has been non-weight bearing on his right LE  Review of systems negative unless otherwise specified in HPI      Past Medical History:   Diagnosis Date    ADHD        Past Surgical History:   Procedure Laterality Date    HERNIA REPAIR         History reviewed  No pertinent family history  Social History     Occupational History    Not on file       Social History Main Topics    Smoking status: Never Smoker    Smokeless tobacco: Never Used      Comment: pt uses vape pen    Alcohol use Yes      Comment: socially    Drug use: No    Sexual activity: Not on file         Current Outpatient Prescriptions:     acetaminophen-codeine (TYLENOL #3) 300-30 mg per tablet, Take 1 tablet by mouth every 6 (six) hours as needed for moderate pain, Disp: 30 tablet, Rfl: 0    ibuprofen (MOTRIN) 600 mg tablet, Take 1 tablet (600 mg total) by mouth every 6 (six) hours as needed for mild pain for up to 15 days, Disp: 15 tablet, Rfl: 0    methocarbamol (ROBAXIN) 500 mg tablet, , Disp: , Rfl:     VYVANSE 40 MG capsule, TAKE 1 CAPSULE BY MOUTH EVERY DAY IN THE MORNING, Disp: , Rfl: 0    No Known Allergies         Vitals:    08/15/18 1521   BP: 125/75   Pulse: 82       Objective:          Physical Exam                    Right Ankle Exam   Swelling: mild    Tenderness   The patient is experiencing tenderness in the lateral malleolus and medial malleolus  Tests   Anterior drawer: negative  Other   Sensation: normal     Comments:    Squeeze test is negative  Resolving ecchymosis  Healed fracture blisters without drainage  Toes warm and mobile  Palpable dorsal pedal pulse  Calf is soft and non-tender without signs of DVT    NVID            Diagnostics, reviewed and taken today if performed as documented: The attending physician has personally reviewed the pertinent films in PACS and interpretation is as follows:  Right Ankle:  Trimalleolar fracture right ankle in stable alignment and position, mortise remains intact         Procedures, if performed today:  Cast application  Date/Time: 8/15/2018 3:44 PM  Performed by: Brandy Osman by: Bashir Richardson     Consent:     Consent obtained:  Verbal    Consent given by:  Patient and parent    Risks discussed:  Pain  Pre-procedure details:     Sensation:  Normal  Procedure details:     Laterality:  Right    Location:  Ankle    Ankle:  R ankle    Strapping: no  Cast type:  Short leg    Supplies:  Etta Mariano

## 2018-09-05 ENCOUNTER — APPOINTMENT (OUTPATIENT)
Dept: RADIOLOGY | Facility: CLINIC | Age: 18
End: 2018-09-05
Payer: COMMERCIAL

## 2018-09-05 ENCOUNTER — OFFICE VISIT (OUTPATIENT)
Dept: OBGYN CLINIC | Facility: CLINIC | Age: 18
End: 2018-09-05
Payer: COMMERCIAL

## 2018-09-05 VITALS — HEART RATE: 89 BPM | SYSTOLIC BLOOD PRESSURE: 115 MMHG | DIASTOLIC BLOOD PRESSURE: 74 MMHG

## 2018-09-05 DIAGNOSIS — M25.571 PAIN, JOINT, ANKLE AND FOOT, RIGHT: ICD-10-CM

## 2018-09-05 DIAGNOSIS — S82.851D CLOSED TRIMALLEOLAR FRACTURE OF RIGHT ANKLE WITH ROUTINE HEALING, SUBSEQUENT ENCOUNTER: ICD-10-CM

## 2018-09-05 DIAGNOSIS — S82.851D CLOSED TRIMALLEOLAR FRACTURE OF RIGHT ANKLE WITH ROUTINE HEALING, SUBSEQUENT ENCOUNTER: Primary | ICD-10-CM

## 2018-09-05 PROCEDURE — 99213 OFFICE O/P EST LOW 20 MIN: CPT | Performed by: ORTHOPAEDIC SURGERY

## 2018-09-05 PROCEDURE — 73600 X-RAY EXAM OF ANKLE: CPT

## 2018-09-05 PROCEDURE — 73610 X-RAY EXAM OF ANKLE: CPT

## 2018-09-05 NOTE — PROGRESS NOTES
Assessment/Plan: Closed trimalleolar fracture of right ankle with clinical healing      -Cast removed today, transitioned to CAM boot, continue non-weightbearing to RLE  -Start physical therapy for right ankle ROM and strengthening   -Follow up 2 weeks with repeat right ankle 3 view x-ray's to check on callus formation to potentially start weightbearing at that time      No problem-specific Assessment & Plan notes found for this encounter  Problem List Items Addressed This Visit     None      Visit Diagnoses     Closed trimalleolar fracture of right ankle with routine healing, subsequent encounter    -  Primary    Relevant Orders    XR ankle 3+ vw right    XR ankle 2 vw right    Ambulatory referral to Physical Therapy    Cam Boot    Pain, joint, ankle and foot, right                Subjective:      Patient ID: Edison Zavala is a 21 y o  male  HPI 21 y o  male presents to the office today for 5week follow up for right tri-malleolar ankle fracture that accoured on 07/28/2018  Overall the patient is doing well  The patient was placed in a waterproof non weightbearing short leg cast at his last visit  The patient has been compliant with non weightbearing to his RLE as well as keeping the cast clean and intact  The patient is not complaining of any pain today  The patient denies associated numbness or tingling to his RLE  The following portions of the patient's history were reviewed and updated as appropriate: allergies, current medications, past family history, past medical history, past social history, past surgical history and problem list     Review of Systems   Constitutional: Negative for chills, fever and unexpected weight change  HENT: Negative for hearing loss, nosebleeds and sore throat  Eyes: Negative for pain, redness and visual disturbance  Respiratory: Negative for cough, shortness of breath and wheezing  Cardiovascular: Negative for chest pain, palpitations and leg swelling  Gastrointestinal: Negative for abdominal pain, nausea and vomiting  Endocrine: Negative for polydipsia and polyuria  Genitourinary: Negative for difficulty urinating and hematuria  Musculoskeletal: Negative for arthralgias, joint swelling and myalgias  Skin: Negative for rash and wound  Neurological: Negative for dizziness, numbness and headaches  Psychiatric/Behavioral: Negative for decreased concentration, dysphoric mood and suicidal ideas  The patient is not nervous/anxious  Objective:      /74   Pulse 89          Physical Exam   Constitutional: He is oriented to person, place, and time  He appears well-developed and well-nourished  Eyes: Conjunctivae are normal  Pupils are equal, round, and reactive to light  Pulmonary/Chest: Effort normal and breath sounds normal    Neurological: He is alert and oriented to person, place, and time  Skin: Skin is warm and dry  Psychiatric: He has a normal mood and affect   His behavior is normal        Right ankle:  Sensation intact   Minimal swelling   No pian with dorsiflexion   Healed fracture blisters that have scabbed over     Imaging:  Right ankle:  Good fracture alignments, minimal callus formation

## 2018-09-13 ENCOUNTER — EVALUATION (OUTPATIENT)
Dept: PHYSICAL THERAPY | Facility: CLINIC | Age: 18
End: 2018-09-13
Payer: COMMERCIAL

## 2018-09-13 DIAGNOSIS — S82.851D CLOSED TRIMALLEOLAR FRACTURE OF RIGHT ANKLE WITH ROUTINE HEALING, SUBSEQUENT ENCOUNTER: ICD-10-CM

## 2018-09-13 DIAGNOSIS — S82.851A TRIMALLEOLAR FRACTURE OF ANKLE, CLOSED, RIGHT, INITIAL ENCOUNTER: ICD-10-CM

## 2018-09-13 PROCEDURE — 97110 THERAPEUTIC EXERCISES: CPT | Performed by: PHYSICAL THERAPIST

## 2018-09-13 PROCEDURE — 97162 PT EVAL MOD COMPLEX 30 MIN: CPT | Performed by: PHYSICAL THERAPIST

## 2018-09-13 PROCEDURE — G8980 MOBILITY D/C STATUS: HCPCS | Performed by: PHYSICAL THERAPIST

## 2018-09-13 PROCEDURE — G8979 MOBILITY GOAL STATUS: HCPCS | Performed by: PHYSICAL THERAPIST

## 2018-09-13 PROCEDURE — G8978 MOBILITY CURRENT STATUS: HCPCS | Performed by: PHYSICAL THERAPIST
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
## 2018-09-19 ENCOUNTER — APPOINTMENT (OUTPATIENT)
Dept: RADIOLOGY | Facility: CLINIC | Age: 18
End: 2018-09-19
Payer: COMMERCIAL

## 2018-09-19 ENCOUNTER — OFFICE VISIT (OUTPATIENT)
Dept: OBGYN CLINIC | Facility: CLINIC | Age: 18
End: 2018-09-19

## 2018-09-19 VITALS — SYSTOLIC BLOOD PRESSURE: 123 MMHG | HEIGHT: 72 IN | DIASTOLIC BLOOD PRESSURE: 77 MMHG | HEART RATE: 97 BPM

## 2018-09-19 DIAGNOSIS — S82.851A TRIMALLEOLAR FRACTURE OF ANKLE, CLOSED, RIGHT, INITIAL ENCOUNTER: ICD-10-CM

## 2018-09-19 DIAGNOSIS — S82.851A TRIMALLEOLAR FRACTURE OF ANKLE, CLOSED, RIGHT, INITIAL ENCOUNTER: Primary | ICD-10-CM

## 2018-09-19 PROCEDURE — 73610 X-RAY EXAM OF ANKLE: CPT

## 2018-09-19 PROCEDURE — 99024 POSTOP FOLLOW-UP VISIT: CPT | Performed by: ORTHOPAEDIC SURGERY

## 2018-09-19 NOTE — PROGRESS NOTES
Assessment:  1  Trimalleolar fracture of ankle, closed, right, initial encounter  XR ankle 3+ vw right     Patient Active Problem List   Diagnosis    Trimalleolar fracture of ankle, closed, right, initial encounter    Acute pharyngitis    Bee sting    Cellulitis of left upper arm    Chest wall pain    Functional heart murmur    Headache    Late talker    Orthostatic hypotension    Wart           Plan      50% weight-bearing   follow-up in 2 weeks at which time we will repeat the x-rays and possibly start weight-bearing as tolerated he has been instructed that if he has any increase in pain he is to back off the weight-bearing  He is only participate in therapy 1 visit but due to inability to do so            Subjective:     Patient ID:    Chief Complaint:Ministerio Quiñonez 1824 21 y o  male      HPI     patient comes in today now 7 weeks  Out from trimalleolar fracture equivalent  Doing rather well no increase in pain  He has been working with exercises has only been to therapy once otherwise wearing the boot and using bilateral axial crutches      The following portions of the patient's history were reviewed and updated as appropriate: allergies, current medications, past family history, past social history, past surgical history and problem list     All organ systems normal    Social History     Social History    Marital status: Single     Spouse name: N/A    Number of children: N/A    Years of education: N/A     Occupational History    Not on file       Social History Main Topics    Smoking status: Never Smoker    Smokeless tobacco: Never Used      Comment: pt uses vape pen    Alcohol use Yes      Comment: socially    Drug use: No    Sexual activity: Not on file     Other Topics Concern    Not on file     Social History Narrative    No narrative on file     Past Medical History:   Diagnosis Date    ADHD     Ankle fracture, right 07/28/2018     Past Surgical History:   Procedure Laterality Date    HERNIA REPAIR       No Known Allergies  Current Outpatient Prescriptions on File Prior to Visit   Medication Sig Dispense Refill    acetaminophen-codeine (TYLENOL #3) 300-30 mg per tablet Take 1 tablet by mouth every 6 (six) hours as needed for moderate pain 30 tablet 0    methocarbamol (ROBAXIN) 500 mg tablet       VYVANSE 40 MG capsule TAKE 1 CAPSULE BY MOUTH EVERY DAY IN THE MORNING  0    ibuprofen (MOTRIN) 600 mg tablet Take 1 tablet (600 mg total) by mouth every 6 (six) hours as needed for mild pain for up to 15 days 15 tablet 0     No current facility-administered medications on file prior to visit  Objective:        Ortho Exam        I have personally reviewed pertinent films in PACS and my interpretation is Stable healing fracture there is subtle cracks in the medial malleolus  he is fracture show healing process is occurring  The posterior malleolus is stable as well as is the fibular fracture  Portions of the record may have been created with voice recognition software   Occasional wrong word or "sound a like" substitutions may have occurred due to the inherent limitations of voice recognition software   Read the chart carefully and recognize, using context, where substitutions have occurred

## 2018-10-05 ENCOUNTER — OFFICE VISIT (OUTPATIENT)
Dept: OBGYN CLINIC | Facility: CLINIC | Age: 18
End: 2018-10-05

## 2018-10-05 ENCOUNTER — APPOINTMENT (OUTPATIENT)
Dept: RADIOLOGY | Facility: CLINIC | Age: 18
End: 2018-10-05
Payer: COMMERCIAL

## 2018-10-05 VITALS
DIASTOLIC BLOOD PRESSURE: 79 MMHG | SYSTOLIC BLOOD PRESSURE: 129 MMHG | BODY MASS INDEX: 23.7 KG/M2 | HEART RATE: 64 BPM | WEIGHT: 175 LBS | HEIGHT: 72 IN

## 2018-10-05 DIAGNOSIS — S82.851A TRIMALLEOLAR FRACTURE OF ANKLE, CLOSED, RIGHT, INITIAL ENCOUNTER: ICD-10-CM

## 2018-10-05 DIAGNOSIS — S82.851A TRIMALLEOLAR FRACTURE OF ANKLE, CLOSED, RIGHT, INITIAL ENCOUNTER: Primary | ICD-10-CM

## 2018-10-05 PROCEDURE — 73610 X-RAY EXAM OF ANKLE: CPT

## 2018-10-05 PROCEDURE — 99024 POSTOP FOLLOW-UP VISIT: CPT | Performed by: ORTHOPAEDIC SURGERY

## 2018-10-05 NOTE — PROGRESS NOTES
Assessment:  1  Trimalleolar fracture of ankle, closed, right, initial encounter  XR ankle 3+ vw right     Patient Active Problem List   Diagnosis    Trimalleolar fracture of ankle, closed, right, initial encounter    Acute pharyngitis    Bee sting    Cellulitis of left upper arm    Chest wall pain    Functional heart murmur    Headache    Late talker    Orthostatic hypotension    Wart           Plan      Weight-bearing as tolerated follow up in 4 weeks he will also start weaning the Cam walker boot but I want him to start weight-bearing as tolerated without crutches see how he does for about a week then again start wean the Cam walker boot if he finds that he is walking without the boot and having no problems then he can come in early week repeat the x-rays and maybe allow him to drive at that point but because it involves the right leg he is wearing a boot and he is not yet full weight-bearing he still not able to drive            Subjective:     Patient ID:    Chief Complaint:Ministerio Quiñonez [birth year]22 19 y o  male      HPI     patient comes in today with regards to his right ankle he is now 2 months out from his original injury he reports that he does have some pain at the end a night but he has been trying to walk occasionally without the boot  Time he still using 1 crutch to get around  Social History     Social History    Marital status: Single     Spouse name: N/A    Number of children: N/A    Years of education: N/A     Occupational History    Not on file       Social History Main Topics    Smoking status: Never Smoker    Smokeless tobacco: Never Used      Comment: pt uses vape pen    Alcohol use Yes      Comment: socially    Drug use: No    Sexual activity: Not on file     Other Topics Concern    Not on file     Social History Narrative    No narrative on file     Past Medical History:   Diagnosis Date    ADHD     Ankle fracture, right 07/28/2018     Past Surgical History:   Procedure Laterality Date    HERNIA REPAIR       No Known Allergies  Current Outpatient Prescriptions on File Prior to Visit   Medication Sig Dispense Refill    acetaminophen-codeine (TYLENOL #3) 300-30 mg per tablet Take 1 tablet by mouth every 6 (six) hours as needed for moderate pain 30 tablet 0    methocarbamol (ROBAXIN) 500 mg tablet       VYVANSE 40 MG capsule TAKE 1 CAPSULE BY MOUTH EVERY DAY IN THE MORNING  0    ibuprofen (MOTRIN) 600 mg tablet Take 1 tablet (600 mg total) by mouth every 6 (six) hours as needed for mild pain for up to 15 days 15 tablet 0     No current facility-administered medications on file prior to visit  Objective:    Review of Systems    Ortho Exam    Mild swelling no ecchymosis there is hemosiderin deposits both knees medially laterally range of motion is slightly limited and the is some atrophy of the gastrocsoleus complex  Physical Exam    I have personally reviewed pertinent films in PACS

## 2018-10-16 NOTE — PROGRESS NOTES
Addendum: Added discharge G-codes and resolved episode of care  Pt has not returned to PT since initial evaluation and will now be discharged

## 2018-10-18 ENCOUNTER — OFFICE VISIT (OUTPATIENT)
Dept: FAMILY MEDICINE CLINIC | Facility: MEDICAL CENTER | Age: 18
End: 2018-10-18
Payer: COMMERCIAL

## 2018-10-18 VITALS
TEMPERATURE: 97.9 F | RESPIRATION RATE: 18 BRPM | HEART RATE: 88 BPM | SYSTOLIC BLOOD PRESSURE: 122 MMHG | BODY MASS INDEX: 23.87 KG/M2 | WEIGHT: 176 LBS | DIASTOLIC BLOOD PRESSURE: 76 MMHG

## 2018-10-18 DIAGNOSIS — J06.9 UPPER RESPIRATORY TRACT INFECTION, UNSPECIFIED TYPE: Primary | ICD-10-CM

## 2018-10-18 PROCEDURE — 99213 OFFICE O/P EST LOW 20 MIN: CPT | Performed by: FAMILY MEDICINE

## 2018-10-18 NOTE — PROGRESS NOTES
Assessment/Plan:    No problem-specific Assessment & Plan notes found for this encounter  Diagnoses and all orders for this visit:    Upper respiratory tract infection, unspecified type  Patient has an upper respiratory tract infection which is likely viral   Symptomatic care at this time  Stay well hydrated  I discussed with mom and patient what medications he should not take  See after visit summary for more details  If symptoms worsen or fever develops they were instructed to call the office or go to the ER right away  Follow-up in one week if symptoms persist or sooner if needed  Subjective:      Patient ID: Ronen Ortiz is a 19 y o  male  Patient presents with a chief complaint of runny nose, sore throat, body aches and a cough  He is here with his mom today  No fevers  No chest pain or trouble breathing  Symptoms started few days ago  Mom wants to make sure of what patient can take since he is on Vyvanse  The following portions of the patient's history were reviewed and updated as appropriate:   He   Patient Active Problem List    Diagnosis Date Noted    Trimalleolar fracture of ankle, closed, right, initial encounter 07/30/2018    Bee sting 05/31/2016    Cellulitis of left upper arm 05/31/2016    Acute pharyngitis 02/03/2016    Headache 02/03/2016    Chest wall pain 01/26/2015    Orthostatic hypotension 01/26/2015    Wart 01/26/2015    Functional heart murmur 10/13/2014    Late talker 01/15/2013     Current Outpatient Prescriptions   Medication Sig Dispense Refill    VYVANSE 40 MG capsule TAKE 1 CAPSULE BY MOUTH EVERY DAY IN THE MORNING  0    ibuprofen (MOTRIN) 600 mg tablet Take 1 tablet (600 mg total) by mouth every 6 (six) hours as needed for mild pain for up to 15 days 15 tablet 0     No current facility-administered medications for this visit  He has No Known Allergies       Review of Systems   Constitutional: Negative for fever    Respiratory: Negative for shortness of breath  Cardiovascular: Negative for chest pain  Objective:      /76 (BP Location: Left arm, Patient Position: Sitting, Cuff Size: Adult)   Pulse 88   Temp 97 9 °F (36 6 °C) (Oral)   Resp 18   Wt 79 8 kg (176 lb)   BMI 23 87 kg/m²          Physical Exam   Constitutional: Vital signs are normal  He appears well-developed and well-nourished  HENT:   Head: Normocephalic and atraumatic  Right Ear: Tympanic membrane, external ear and ear canal normal    Left Ear: Tympanic membrane, external ear and ear canal normal    Nose: Rhinorrhea present  No mucosal edema  Mouth/Throat: Uvula is midline and mucous membranes are normal    Post Nasal Drainage  Eyes: Pupils are equal, round, and reactive to light  Conjunctivae, EOM and lids are normal    Neck: Trachea normal    Cardiovascular: Normal rate, regular rhythm and normal heart sounds  No murmur heard  Pulmonary/Chest: Effort normal and breath sounds normal    Lymphadenopathy:     He has cervical adenopathy  Right cervical: Superficial cervical adenopathy present  Left cervical: Superficial cervical adenopathy present

## 2018-10-18 NOTE — PATIENT INSTRUCTIONS
For your symptoms you can take the following medications:   Diphenhydramine which is generic Benadryl  Benadryl will make you drowsy and help the sleep  Dextromethorphan which is generic Delsym  This is a cough suppressant  Guaifenesin which is generic Mucinex or Robitussin  This will help thin out mucus and make your cough more productive  You can use acetaminophen which is Tylenol or ibuprofen which is Motrin or Advil for pain or fever if it develops  Please avoid pseudoephedrine which is Sudafed and phenylephrine  Both of these are decongestants and do not mix well with Vyvanse

## 2018-10-25 ENCOUNTER — APPOINTMENT (OUTPATIENT)
Dept: RADIOLOGY | Facility: MEDICAL CENTER | Age: 18
End: 2018-10-25
Payer: COMMERCIAL

## 2018-10-25 ENCOUNTER — OFFICE VISIT (OUTPATIENT)
Dept: OBGYN CLINIC | Facility: MEDICAL CENTER | Age: 18
End: 2018-10-25

## 2018-10-25 VITALS
WEIGHT: 175 LBS | HEART RATE: 75 BPM | BODY MASS INDEX: 23.7 KG/M2 | DIASTOLIC BLOOD PRESSURE: 77 MMHG | HEIGHT: 72 IN | SYSTOLIC BLOOD PRESSURE: 118 MMHG

## 2018-10-25 DIAGNOSIS — S82.851A TRIMALLEOLAR FRACTURE OF ANKLE, CLOSED, RIGHT, INITIAL ENCOUNTER: ICD-10-CM

## 2018-10-25 DIAGNOSIS — S82.851A TRIMALLEOLAR FRACTURE OF ANKLE, CLOSED, RIGHT, INITIAL ENCOUNTER: Primary | ICD-10-CM

## 2018-10-25 PROCEDURE — 99024 POSTOP FOLLOW-UP VISIT: CPT | Performed by: ORTHOPAEDIC SURGERY

## 2018-10-25 PROCEDURE — 73610 X-RAY EXAM OF ANKLE: CPT

## 2018-10-25 NOTE — PROGRESS NOTES
Assessment:  1  Trimalleolar fracture of ankle, closed, right, initial encounter  XR ankle 3+ vw right     Patient Active Problem List   Diagnosis    Trimalleolar fracture of ankle, closed, right, initial encounter    Acute pharyngitis    Bee sting    Cellulitis of left upper arm    Chest wall pain    Functional heart murmur    Headache    Late talker    Orthostatic hypotension    Wart           Plan  Tingling at the dorsum foot is due to hyper anesthesia patient instructed to desensitize the area         Weight-bearing as tolerated transition out of Cam boot            Subjective:     Patient ID:    Chief Complaint:Ministerio Pruitt 19 y o  male      HPI    Patient comes in today with regards trimalleolar ankle fracture treated non operatively  Patient is doing very well he is in a Cam boot full weight-bearing no increase in pain no pain at all actually some mild tingling with palpation over the dorsum of foot but otherwise no other issue      The following portions of the patient's history were reviewed and updated as appropriate: allergies, current medications, past family history, past social history, past surgical history and problem list     All organ systems normal    Social History     Social History    Marital status: Single     Spouse name: N/A    Number of children: N/A    Years of education: N/A     Occupational History    Not on file       Social History Main Topics    Smoking status: Never Smoker    Smokeless tobacco: Never Used      Comment: pt uses vape pen    Alcohol use Yes      Comment: socially    Drug use: No    Sexual activity: Not on file     Other Topics Concern    Not on file     Social History Narrative    No narrative on file     Past Medical History:   Diagnosis Date    ADHD     Ankle fracture, right 07/28/2018     Past Surgical History:   Procedure Laterality Date    HERNIA REPAIR       No Known Allergies  Current Outpatient Prescriptions on File Prior to Visit Medication Sig Dispense Refill    VYVANSE 40 MG capsule TAKE 1 CAPSULE BY MOUTH EVERY DAY IN THE MORNING  0    ibuprofen (MOTRIN) 600 mg tablet Take 1 tablet (600 mg total) by mouth every 6 (six) hours as needed for mild pain for up to 15 days 15 tablet 0     No current facility-administered medications on file prior to visit  Objective:        Ortho Exam    Range of motion within normal limits  I have personally reviewed pertinent films in PACS and my interpretation is Nearly complete healing of the fracture the only area where there is no healing is where the spike is extended but otherwise were there is contact between the 2 bones there is full healing  Portions of the record may have been created with voice recognition software   Occasional wrong word or "sound a like" substitutions may have occurred due to the inherent limitations of voice recognition software   Read the chart carefully and recognize, using context, where substitutions have occurred

## 2019-03-04 ENCOUNTER — TELEPHONE (OUTPATIENT)
Dept: OBGYN CLINIC | Facility: HOSPITAL | Age: 19
End: 2019-03-04

## 2019-03-04 NOTE — TELEPHONE ENCOUNTER
Patient's mom Kelsy Ramos called requesting another referral for patient to have physical therapy  Patient had 1 session of PT on 9/13/18 and did not have any other sessions  Kelsy Rachel states patient's right leg is still experiencing pain and that's why their requesting PT, but they need another referral sent   Please advise, vy    Call back# [phone]

## 2019-03-15 ENCOUNTER — OFFICE VISIT (OUTPATIENT)
Dept: OBGYN CLINIC | Facility: MEDICAL CENTER | Age: 19
End: 2019-03-15
Payer: COMMERCIAL

## 2019-03-15 ENCOUNTER — APPOINTMENT (OUTPATIENT)
Dept: RADIOLOGY | Facility: MEDICAL CENTER | Age: 19
End: 2019-03-15
Payer: COMMERCIAL

## 2019-03-15 DIAGNOSIS — S82.851D CLOSED TRIMALLEOLAR FRACTURE OF RIGHT ANKLE WITH ROUTINE HEALING, SUBSEQUENT ENCOUNTER: ICD-10-CM

## 2019-03-15 DIAGNOSIS — M79.671 PAIN IN RIGHT FOOT: ICD-10-CM

## 2019-03-15 DIAGNOSIS — S82.851A TRIMALLEOLAR FRACTURE OF ANKLE, CLOSED, RIGHT, INITIAL ENCOUNTER: ICD-10-CM

## 2019-03-15 DIAGNOSIS — S82.851A TRIMALLEOLAR FRACTURE OF ANKLE, CLOSED, RIGHT, INITIAL ENCOUNTER: Primary | ICD-10-CM

## 2019-03-15 PROCEDURE — 73610 X-RAY EXAM OF ANKLE: CPT

## 2019-03-15 PROCEDURE — 99213 OFFICE O/P EST LOW 20 MIN: CPT | Performed by: ORTHOPAEDIC SURGERY

## 2019-03-15 PROCEDURE — 73630 X-RAY EXAM OF FOOT: CPT

## 2019-03-15 NOTE — PROGRESS NOTES
Assessment:  1  Trimalleolar fracture of ankle, closed, right, initial encounter  XR ankle 3+ vw right    XR foot 3+ vw right   2  Closed trimalleolar fracture of right ankle with routine healing, subsequent encounter  XR ankle 3+ vw right    XR foot 3+ vw right     Patient Active Problem List   Diagnosis    Trimalleolar fracture of ankle, closed, right, initial encounter    Acute pharyngitis    Bee sting    Cellulitis of left upper arm    Chest wall pain    Functional heart murmur    Headache    Late talker    Orthostatic hypotension    Wart           Plan      Referral physical therapy  Referral specifically to nehemiah Solis for orthotic as well            Subjective:     Patient ID:    Chief Complaint:Ministerio Pruitt 19 y o  male      HPI    Patient comes in today with regards to his right ankle  He has pinpoint tenderness over the anterior lateral aspect of the talus and anterior aspect of lateral malleolus    Patient had a trimalleolar equivalent that had been treated non operatively he had been doing well but then it started bothering him again especially after a long day of work      The following portions of the patient's history were reviewed and updated as appropriate: allergies, current medications, past family history, past social history, past surgical history and problem list     All organ systems normal    Social History     Socioeconomic History    Marital status: Single     Spouse name: Not on file    Number of children: Not on file    Years of education: Not on file    Highest education level: Not on file   Occupational History    Not on file   Social Needs    Financial resource strain: Not on file    Food insecurity:     Worry: Not on file     Inability: Not on file    Transportation needs:     Medical: Not on file     Non-medical: Not on file   Tobacco Use    Smoking status: Never Smoker    Smokeless tobacco: Never Used    Tobacco comment: pt uses vape pen   Substance and Sexual Activity    Alcohol use: Yes     Comment: socially    Drug use: No    Sexual activity: Not on file   Lifestyle    Physical activity:     Days per week: Not on file     Minutes per session: Not on file    Stress: Not on file   Relationships    Social connections:     Talks on phone: Not on file     Gets together: Not on file     Attends Faith service: Not on file     Active member of club or organization: Not on file     Attends meetings of clubs or organizations: Not on file     Relationship status: Not on file    Intimate partner violence:     Fear of current or ex partner: Not on file     Emotionally abused: Not on file     Physically abused: Not on file     Forced sexual activity: Not on file   Other Topics Concern    Not on file   Social History Narrative    Not on file     Past Medical History:   Diagnosis Date    ADHD     Ankle fracture, right 07/28/2018     Past Surgical History:   Procedure Laterality Date    HERNIA REPAIR       No Known Allergies  Current Outpatient Medications on File Prior to Visit   Medication Sig Dispense Refill    ibuprofen (MOTRIN) 600 mg tablet Take 1 tablet (600 mg total) by mouth every 6 (six) hours as needed for mild pain for up to 15 days 15 tablet 0    VYVANSE 40 MG capsule TAKE 1 CAPSULE BY MOUTH EVERY DAY IN THE MORNING  0     No current facility-administered medications on file prior to visit  Objective:        Ortho Exam  Pinpoint tenderness over the anterior lateral aspect of the talus and left anterior aspect of the fibula  No pain medially or posterior laterally or posterior medially    He does pronate with standing more so on this side than on the other side and when we extension wait this it is more obvious with the pronation and some slight heel valgus as well      I have personally reviewed pertinent films in PACS and my interpretation is Calcification of the deltoid ligament there is also anterior calcification around the capsule which is consistent where his pain is       Portions of the record may have been created with voice recognition software   Occasional wrong word or "sound a like" substitutions may have occurred due to the inherent limitations of voice recognition software   Read the chart carefully and recognize, using context, where substitutions have occurred

## 2019-03-22 ENCOUNTER — EVALUATION (OUTPATIENT)
Dept: PHYSICAL THERAPY | Facility: CLINIC | Age: 19
End: 2019-03-22
Payer: COMMERCIAL

## 2019-03-22 DIAGNOSIS — G89.29 CHRONIC PAIN OF RIGHT ANKLE: Primary | ICD-10-CM

## 2019-03-22 DIAGNOSIS — M25.571 CHRONIC PAIN OF RIGHT ANKLE: Primary | ICD-10-CM

## 2019-03-22 DIAGNOSIS — S82.851S CLOSED TRIMALLEOLAR FRACTURE OF RIGHT ANKLE, SEQUELA: ICD-10-CM

## 2019-03-22 PROCEDURE — 97110 THERAPEUTIC EXERCISES: CPT | Performed by: PHYSICAL THERAPIST

## 2019-03-22 PROCEDURE — 97162 PT EVAL MOD COMPLEX 30 MIN: CPT | Performed by: PHYSICAL THERAPIST

## 2019-03-24 NOTE — PROGRESS NOTES
Daily Note     Today's date: 3/25/2019  Patient name: Amaya Coronado  : 2000  MRN: [id number]  Referring provider: Taylor Schuler DO  Dx:   Encounter Diagnosis     ICD-10-CM    1  Chronic pain of right ankle M25 571     G89 29    2  Closed trimalleolar fracture of right ankle, sequela S82 851S        Start Time: 1700  Stop Time: 1800  Total time in clinic (min): 60 minutes    Subjective: Patient reports "I fell of a horse at work today and my whole left side is sore "        Objective: See treatment diary below      Assessment: Patient has a low tolerance to movement  Patient complains of 10/10 pain with eversion today; however, this subsided after ankle taken out of everted position  Muscle guarding present with manual PROM into PF and inversion  Patient lost neuromuscular control of involved ankle as he had difficulty initiating movement into eversion/inversion  Plan: Continue per plan of care       Precautions: Trimalleolar fracture 18    Daily Treatment Diary     Manual  3/25            TC AP joint 4 min gr III            Lateral TC jt mobilization 3 min gr II+II            PROM of R ankle 3 min                                          Exercise Diary  3/25            Bicycle  7 min            SB gastroc stretch 4x30"            Short foot 30x            Ankle inversion/eversion 20x-arom (aarom into eversion)            Standing HR/TR 30x            Wall squat with TB at knee             Lateral walks with TB              Clam shells with TB             Gait training (HAT)             BAPS board A/P, Circles 20 x each             Gastroc stretch 4x30"                                                                                                                                     Modalities                                                       1:1 with PT 5-136

## 2019-03-25 ENCOUNTER — OFFICE VISIT (OUTPATIENT)
Dept: PHYSICAL THERAPY | Facility: CLINIC | Age: 19
End: 2019-03-25
Payer: COMMERCIAL

## 2019-03-25 DIAGNOSIS — G89.29 CHRONIC PAIN OF RIGHT ANKLE: Primary | ICD-10-CM

## 2019-03-25 DIAGNOSIS — M25.571 CHRONIC PAIN OF RIGHT ANKLE: Primary | ICD-10-CM

## 2019-03-25 DIAGNOSIS — S82.851S CLOSED TRIMALLEOLAR FRACTURE OF RIGHT ANKLE, SEQUELA: ICD-10-CM

## 2019-03-25 PROCEDURE — 97140 MANUAL THERAPY 1/> REGIONS: CPT | Performed by: PHYSICAL THERAPIST

## 2019-03-25 PROCEDURE — 97112 NEUROMUSCULAR REEDUCATION: CPT | Performed by: PHYSICAL THERAPIST

## 2019-03-25 PROCEDURE — 97110 THERAPEUTIC EXERCISES: CPT | Performed by: PHYSICAL THERAPIST

## 2019-03-29 ENCOUNTER — APPOINTMENT (OUTPATIENT)
Dept: PHYSICAL THERAPY | Facility: CLINIC | Age: 19
End: 2019-03-29
Payer: COMMERCIAL

## 2019-04-03 ENCOUNTER — OFFICE VISIT (OUTPATIENT)
Dept: PHYSICAL THERAPY | Facility: CLINIC | Age: 19
End: 2019-04-03
Payer: COMMERCIAL

## 2019-04-03 DIAGNOSIS — G89.29 CHRONIC PAIN OF RIGHT ANKLE: Primary | ICD-10-CM

## 2019-04-03 DIAGNOSIS — M25.571 CHRONIC PAIN OF RIGHT ANKLE: Primary | ICD-10-CM

## 2019-04-03 DIAGNOSIS — S82.851S CLOSED TRIMALLEOLAR FRACTURE OF RIGHT ANKLE, SEQUELA: ICD-10-CM

## 2019-04-03 PROCEDURE — 97140 MANUAL THERAPY 1/> REGIONS: CPT

## 2019-04-03 PROCEDURE — 97530 THERAPEUTIC ACTIVITIES: CPT

## 2019-04-03 PROCEDURE — 97112 NEUROMUSCULAR REEDUCATION: CPT

## 2019-04-04 ENCOUNTER — OFFICE VISIT (OUTPATIENT)
Dept: PHYSICAL THERAPY | Facility: CLINIC | Age: 19
End: 2019-04-04
Payer: COMMERCIAL

## 2019-04-04 DIAGNOSIS — S82.851S CLOSED TRIMALLEOLAR FRACTURE OF RIGHT ANKLE, SEQUELA: ICD-10-CM

## 2019-04-04 DIAGNOSIS — M25.571 CHRONIC PAIN OF RIGHT ANKLE: Primary | ICD-10-CM

## 2019-04-04 DIAGNOSIS — G89.29 CHRONIC PAIN OF RIGHT ANKLE: Primary | ICD-10-CM

## 2019-04-04 PROCEDURE — 97112 NEUROMUSCULAR REEDUCATION: CPT

## 2019-04-04 PROCEDURE — 97140 MANUAL THERAPY 1/> REGIONS: CPT

## 2019-04-04 PROCEDURE — 97110 THERAPEUTIC EXERCISES: CPT

## 2019-04-04 PROCEDURE — 97530 THERAPEUTIC ACTIVITIES: CPT

## 2019-04-08 ENCOUNTER — OFFICE VISIT (OUTPATIENT)
Dept: PHYSICAL THERAPY | Facility: CLINIC | Age: 19
End: 2019-04-08
Payer: COMMERCIAL

## 2019-04-08 DIAGNOSIS — G89.29 CHRONIC PAIN OF RIGHT ANKLE: Primary | ICD-10-CM

## 2019-04-08 DIAGNOSIS — M25.571 CHRONIC PAIN OF RIGHT ANKLE: Primary | ICD-10-CM

## 2019-04-08 DIAGNOSIS — S82.851S CLOSED TRIMALLEOLAR FRACTURE OF RIGHT ANKLE, SEQUELA: ICD-10-CM

## 2019-04-08 PROCEDURE — 97110 THERAPEUTIC EXERCISES: CPT

## 2019-04-08 PROCEDURE — 97112 NEUROMUSCULAR REEDUCATION: CPT

## 2019-04-08 PROCEDURE — 97140 MANUAL THERAPY 1/> REGIONS: CPT

## 2019-04-11 ENCOUNTER — OFFICE VISIT (OUTPATIENT)
Dept: PHYSICAL THERAPY | Facility: CLINIC | Age: 19
End: 2019-04-11
Payer: COMMERCIAL

## 2019-04-11 DIAGNOSIS — M25.571 CHRONIC PAIN OF RIGHT ANKLE: Primary | ICD-10-CM

## 2019-04-11 DIAGNOSIS — G89.29 CHRONIC PAIN OF RIGHT ANKLE: Primary | ICD-10-CM

## 2019-04-11 DIAGNOSIS — S82.851S CLOSED TRIMALLEOLAR FRACTURE OF RIGHT ANKLE, SEQUELA: ICD-10-CM

## 2019-04-11 PROCEDURE — 97140 MANUAL THERAPY 1/> REGIONS: CPT

## 2019-04-11 PROCEDURE — 97112 NEUROMUSCULAR REEDUCATION: CPT

## 2019-04-11 PROCEDURE — 97530 THERAPEUTIC ACTIVITIES: CPT

## 2019-04-15 ENCOUNTER — OFFICE VISIT (OUTPATIENT)
Dept: PHYSICAL THERAPY | Facility: CLINIC | Age: 19
End: 2019-04-15
Payer: COMMERCIAL

## 2019-04-15 DIAGNOSIS — S82.851S CLOSED TRIMALLEOLAR FRACTURE OF RIGHT ANKLE, SEQUELA: ICD-10-CM

## 2019-04-15 DIAGNOSIS — G89.29 CHRONIC PAIN OF RIGHT ANKLE: Primary | ICD-10-CM

## 2019-04-15 DIAGNOSIS — M25.571 CHRONIC PAIN OF RIGHT ANKLE: Primary | ICD-10-CM

## 2019-04-15 PROCEDURE — 97140 MANUAL THERAPY 1/> REGIONS: CPT

## 2019-04-15 PROCEDURE — 97110 THERAPEUTIC EXERCISES: CPT

## 2019-04-15 PROCEDURE — 97112 NEUROMUSCULAR REEDUCATION: CPT

## 2019-04-15 PROCEDURE — 97530 THERAPEUTIC ACTIVITIES: CPT

## 2019-04-18 ENCOUNTER — OFFICE VISIT (OUTPATIENT)
Dept: PHYSICAL THERAPY | Facility: CLINIC | Age: 19
End: 2019-04-18
Payer: COMMERCIAL

## 2019-04-18 DIAGNOSIS — S82.851S CLOSED TRIMALLEOLAR FRACTURE OF RIGHT ANKLE, SEQUELA: ICD-10-CM

## 2019-04-18 DIAGNOSIS — M25.571 CHRONIC PAIN OF RIGHT ANKLE: Primary | ICD-10-CM

## 2019-04-18 DIAGNOSIS — G89.29 CHRONIC PAIN OF RIGHT ANKLE: Primary | ICD-10-CM

## 2019-04-18 PROCEDURE — 97112 NEUROMUSCULAR REEDUCATION: CPT

## 2019-04-18 PROCEDURE — 97140 MANUAL THERAPY 1/> REGIONS: CPT

## 2019-04-18 PROCEDURE — 97110 THERAPEUTIC EXERCISES: CPT

## 2019-04-18 PROCEDURE — 97530 THERAPEUTIC ACTIVITIES: CPT

## 2019-04-22 ENCOUNTER — EVALUATION (OUTPATIENT)
Dept: PHYSICAL THERAPY | Facility: CLINIC | Age: 19
End: 2019-04-22
Payer: COMMERCIAL

## 2019-04-22 DIAGNOSIS — G89.29 CHRONIC PAIN OF RIGHT ANKLE: Primary | ICD-10-CM

## 2019-04-22 DIAGNOSIS — M25.571 CHRONIC PAIN OF RIGHT ANKLE: Primary | ICD-10-CM

## 2019-04-22 DIAGNOSIS — S82.851S CLOSED TRIMALLEOLAR FRACTURE OF RIGHT ANKLE, SEQUELA: ICD-10-CM

## 2019-04-22 PROCEDURE — 97110 THERAPEUTIC EXERCISES: CPT | Performed by: PHYSICAL THERAPIST

## 2019-04-22 PROCEDURE — 97140 MANUAL THERAPY 1/> REGIONS: CPT | Performed by: PHYSICAL THERAPIST

## 2019-04-22 PROCEDURE — 97530 THERAPEUTIC ACTIVITIES: CPT | Performed by: PHYSICAL THERAPIST

## 2019-04-25 ENCOUNTER — OFFICE VISIT (OUTPATIENT)
Dept: PHYSICAL THERAPY | Facility: CLINIC | Age: 19
End: 2019-04-25
Payer: COMMERCIAL

## 2019-04-25 DIAGNOSIS — G89.29 CHRONIC PAIN OF RIGHT ANKLE: Primary | ICD-10-CM

## 2019-04-25 DIAGNOSIS — S82.851S CLOSED TRIMALLEOLAR FRACTURE OF RIGHT ANKLE, SEQUELA: ICD-10-CM

## 2019-04-25 DIAGNOSIS — M25.571 CHRONIC PAIN OF RIGHT ANKLE: Primary | ICD-10-CM

## 2019-04-25 PROCEDURE — 97140 MANUAL THERAPY 1/> REGIONS: CPT

## 2019-04-25 PROCEDURE — 97530 THERAPEUTIC ACTIVITIES: CPT

## 2019-04-25 PROCEDURE — 97110 THERAPEUTIC EXERCISES: CPT

## 2019-04-30 ENCOUNTER — OFFICE VISIT (OUTPATIENT)
Dept: PHYSICAL THERAPY | Facility: CLINIC | Age: 19
End: 2019-04-30
Payer: COMMERCIAL

## 2019-04-30 DIAGNOSIS — G89.29 CHRONIC PAIN OF RIGHT ANKLE: Primary | ICD-10-CM

## 2019-04-30 DIAGNOSIS — M25.571 CHRONIC PAIN OF RIGHT ANKLE: Primary | ICD-10-CM

## 2019-04-30 DIAGNOSIS — S82.851S CLOSED TRIMALLEOLAR FRACTURE OF RIGHT ANKLE, SEQUELA: ICD-10-CM

## 2019-04-30 PROCEDURE — 97530 THERAPEUTIC ACTIVITIES: CPT | Performed by: PHYSICAL THERAPIST

## 2019-04-30 PROCEDURE — 97110 THERAPEUTIC EXERCISES: CPT | Performed by: PHYSICAL THERAPIST

## 2019-04-30 PROCEDURE — 97140 MANUAL THERAPY 1/> REGIONS: CPT | Performed by: PHYSICAL THERAPIST

## 2019-05-02 ENCOUNTER — OFFICE VISIT (OUTPATIENT)
Dept: PHYSICAL THERAPY | Facility: CLINIC | Age: 19
End: 2019-05-02
Payer: COMMERCIAL

## 2019-05-02 DIAGNOSIS — S82.851S CLOSED TRIMALLEOLAR FRACTURE OF RIGHT ANKLE, SEQUELA: ICD-10-CM

## 2019-05-02 DIAGNOSIS — G89.29 CHRONIC PAIN OF RIGHT ANKLE: Primary | ICD-10-CM

## 2019-05-02 DIAGNOSIS — M25.571 CHRONIC PAIN OF RIGHT ANKLE: Primary | ICD-10-CM

## 2019-05-02 PROCEDURE — 97530 THERAPEUTIC ACTIVITIES: CPT

## 2019-05-02 PROCEDURE — 97110 THERAPEUTIC EXERCISES: CPT

## 2019-05-02 PROCEDURE — 97140 MANUAL THERAPY 1/> REGIONS: CPT

## 2019-05-06 ENCOUNTER — APPOINTMENT (OUTPATIENT)
Dept: PHYSICAL THERAPY | Facility: CLINIC | Age: 19
End: 2019-05-06
Payer: COMMERCIAL

## 2019-05-07 ENCOUNTER — OFFICE VISIT (OUTPATIENT)
Dept: PHYSICAL THERAPY | Facility: CLINIC | Age: 19
End: 2019-05-07
Payer: COMMERCIAL

## 2019-05-07 DIAGNOSIS — M25.571 CHRONIC PAIN OF RIGHT ANKLE: Primary | ICD-10-CM

## 2019-05-07 DIAGNOSIS — G89.29 CHRONIC PAIN OF RIGHT ANKLE: Primary | ICD-10-CM

## 2019-05-07 DIAGNOSIS — S82.851S CLOSED TRIMALLEOLAR FRACTURE OF RIGHT ANKLE, SEQUELA: ICD-10-CM

## 2019-05-07 PROCEDURE — 97140 MANUAL THERAPY 1/> REGIONS: CPT | Performed by: PHYSICAL THERAPIST

## 2019-05-07 PROCEDURE — 97530 THERAPEUTIC ACTIVITIES: CPT | Performed by: PHYSICAL THERAPIST

## 2019-05-07 PROCEDURE — 97110 THERAPEUTIC EXERCISES: CPT | Performed by: PHYSICAL THERAPIST

## 2019-05-09 ENCOUNTER — APPOINTMENT (OUTPATIENT)
Dept: PHYSICAL THERAPY | Facility: CLINIC | Age: 19
End: 2019-05-09
Payer: COMMERCIAL

## 2019-05-14 ENCOUNTER — APPOINTMENT (OUTPATIENT)
Dept: PHYSICAL THERAPY | Facility: CLINIC | Age: 19
End: 2019-05-14
Payer: COMMERCIAL

## 2019-05-21 ENCOUNTER — EVALUATION (OUTPATIENT)
Dept: PHYSICAL THERAPY | Facility: CLINIC | Age: 19
End: 2019-05-21
Payer: COMMERCIAL

## 2019-05-21 DIAGNOSIS — G89.29 CHRONIC PAIN OF RIGHT ANKLE: Primary | ICD-10-CM

## 2019-05-21 DIAGNOSIS — M25.571 CHRONIC PAIN OF RIGHT ANKLE: Primary | ICD-10-CM

## 2019-05-21 DIAGNOSIS — S82.851S CLOSED TRIMALLEOLAR FRACTURE OF RIGHT ANKLE, SEQUELA: ICD-10-CM

## 2019-05-21 PROCEDURE — 97110 THERAPEUTIC EXERCISES: CPT | Performed by: PHYSICAL THERAPIST

## 2019-05-21 PROCEDURE — 97112 NEUROMUSCULAR REEDUCATION: CPT | Performed by: PHYSICAL THERAPIST

## 2019-05-21 PROCEDURE — 97140 MANUAL THERAPY 1/> REGIONS: CPT | Performed by: PHYSICAL THERAPIST

## 2019-05-30 ENCOUNTER — APPOINTMENT (OUTPATIENT)
Dept: PHYSICAL THERAPY | Facility: CLINIC | Age: 19
End: 2019-05-30
Payer: COMMERCIAL

## 2019-06-04 ENCOUNTER — OFFICE VISIT (OUTPATIENT)
Dept: PHYSICAL THERAPY | Facility: CLINIC | Age: 19
End: 2019-06-04
Payer: COMMERCIAL

## 2019-06-04 DIAGNOSIS — S82.851S CLOSED TRIMALLEOLAR FRACTURE OF RIGHT ANKLE, SEQUELA: ICD-10-CM

## 2019-06-04 DIAGNOSIS — M25.571 CHRONIC PAIN OF RIGHT ANKLE: Primary | ICD-10-CM

## 2019-06-04 DIAGNOSIS — G89.29 CHRONIC PAIN OF RIGHT ANKLE: Primary | ICD-10-CM

## 2019-06-04 PROCEDURE — 97140 MANUAL THERAPY 1/> REGIONS: CPT | Performed by: PHYSICAL THERAPIST

## 2019-06-04 PROCEDURE — 97530 THERAPEUTIC ACTIVITIES: CPT | Performed by: PHYSICAL THERAPIST

## 2019-06-04 PROCEDURE — 97110 THERAPEUTIC EXERCISES: CPT | Performed by: PHYSICAL THERAPIST

## 2019-06-11 ENCOUNTER — OFFICE VISIT (OUTPATIENT)
Dept: PHYSICAL THERAPY | Facility: CLINIC | Age: 19
End: 2019-06-11
Payer: COMMERCIAL

## 2019-06-11 DIAGNOSIS — M25.571 CHRONIC PAIN OF RIGHT ANKLE: Primary | ICD-10-CM

## 2019-06-11 DIAGNOSIS — G89.29 CHRONIC PAIN OF RIGHT ANKLE: Primary | ICD-10-CM

## 2019-06-11 DIAGNOSIS — S82.851S CLOSED TRIMALLEOLAR FRACTURE OF RIGHT ANKLE, SEQUELA: ICD-10-CM

## 2019-06-11 PROCEDURE — 97530 THERAPEUTIC ACTIVITIES: CPT | Performed by: PHYSICAL THERAPIST

## 2019-06-11 PROCEDURE — 97112 NEUROMUSCULAR REEDUCATION: CPT | Performed by: PHYSICAL THERAPIST

## 2019-06-11 PROCEDURE — 97140 MANUAL THERAPY 1/> REGIONS: CPT | Performed by: PHYSICAL THERAPIST

## 2019-06-11 PROCEDURE — 97110 THERAPEUTIC EXERCISES: CPT | Performed by: PHYSICAL THERAPIST

## 2019-06-18 ENCOUNTER — EVALUATION (OUTPATIENT)
Dept: PHYSICAL THERAPY | Facility: CLINIC | Age: 19
End: 2019-06-18
Payer: COMMERCIAL

## 2019-06-18 DIAGNOSIS — G89.29 CHRONIC PAIN OF RIGHT ANKLE: Primary | ICD-10-CM

## 2019-06-18 DIAGNOSIS — S82.851S CLOSED TRIMALLEOLAR FRACTURE OF RIGHT ANKLE, SEQUELA: ICD-10-CM

## 2019-06-18 DIAGNOSIS — M25.571 CHRONIC PAIN OF RIGHT ANKLE: Primary | ICD-10-CM

## 2019-06-18 PROCEDURE — 97112 NEUROMUSCULAR REEDUCATION: CPT | Performed by: PHYSICAL THERAPIST

## 2019-06-18 PROCEDURE — 97140 MANUAL THERAPY 1/> REGIONS: CPT | Performed by: PHYSICAL THERAPIST

## 2019-06-18 PROCEDURE — 97530 THERAPEUTIC ACTIVITIES: CPT | Performed by: PHYSICAL THERAPIST

## 2019-06-18 PROCEDURE — 97110 THERAPEUTIC EXERCISES: CPT | Performed by: PHYSICAL THERAPIST

## 2019-07-02 ENCOUNTER — OFFICE VISIT (OUTPATIENT)
Dept: PHYSICAL THERAPY | Facility: CLINIC | Age: 19
End: 2019-07-02
Payer: COMMERCIAL

## 2019-07-02 DIAGNOSIS — G89.29 CHRONIC PAIN OF RIGHT ANKLE: Primary | ICD-10-CM

## 2019-07-02 DIAGNOSIS — M25.571 CHRONIC PAIN OF RIGHT ANKLE: Primary | ICD-10-CM

## 2019-07-02 DIAGNOSIS — S82.851S CLOSED TRIMALLEOLAR FRACTURE OF RIGHT ANKLE, SEQUELA: ICD-10-CM

## 2019-07-02 PROCEDURE — 97760 ORTHOTIC MGMT&TRAING 1ST ENC: CPT | Performed by: PHYSICAL THERAPIST

## 2019-07-02 PROCEDURE — 97164 PT RE-EVAL EST PLAN CARE: CPT | Performed by: PHYSICAL THERAPIST

## 2019-07-02 NOTE — PROGRESS NOTES
Re-evaluation     Today's date: 2019  Patient name: Vibha Alexandra  : 2000  MRN: [id number]  Referring provider: Zaira Travis PT  Dx:   Encounter Diagnosis     ICD-10-CM    1  Chronic pain of right ankle M25 571     G89 29    2  Closed trimalleolar fracture of right ankle, sequela S89 011S                   Subjective:   Presents today for custom orthotics  Pt reports continued pain with prolonged physical activity  Objective:   Minimal supination with trunk rotation  Generalized hypermobility noted R foot and ankle  Unable to correct to STJ neutral in stance due to hypomobility  Limited hallux DF PROM  Gait: ER, excessive STJ pronation, med calc whip  Sensation grossly in tact to LT    Casted for custom orthotics  Assessment: Pt should benefit from custom orthotics  Plan: fit orthotics in 2-3 wks

## 2019-08-06 ENCOUNTER — OFFICE VISIT (OUTPATIENT)
Dept: PHYSICAL THERAPY | Facility: CLINIC | Age: 19
End: 2019-08-06
Payer: COMMERCIAL

## 2019-08-06 DIAGNOSIS — M25.571 CHRONIC PAIN OF RIGHT ANKLE: Primary | ICD-10-CM

## 2019-08-06 DIAGNOSIS — G89.29 CHRONIC PAIN OF RIGHT ANKLE: Primary | ICD-10-CM

## 2019-08-06 DIAGNOSIS — S82.851S CLOSED TRIMALLEOLAR FRACTURE OF RIGHT ANKLE, SEQUELA: ICD-10-CM

## 2019-08-06 PROCEDURE — L3010 FOOT LONGITUDINAL ARCH SUPPO: HCPCS | Performed by: PHYSICAL THERAPIST

## 2019-09-11 ENCOUNTER — OFFICE VISIT (OUTPATIENT)
Dept: FAMILY MEDICINE CLINIC | Facility: MEDICAL CENTER | Age: 19
End: 2019-09-11
Payer: COMMERCIAL

## 2019-09-11 VITALS
SYSTOLIC BLOOD PRESSURE: 126 MMHG | HEART RATE: 60 BPM | TEMPERATURE: 98.8 F | BODY MASS INDEX: 24.97 KG/M2 | WEIGHT: 184.38 LBS | RESPIRATION RATE: 14 BRPM | HEIGHT: 72 IN | DIASTOLIC BLOOD PRESSURE: 72 MMHG

## 2019-09-11 DIAGNOSIS — J06.9 UPPER RESPIRATORY TRACT INFECTION, UNSPECIFIED TYPE: Primary | ICD-10-CM

## 2019-09-11 PROCEDURE — 3008F BODY MASS INDEX DOCD: CPT | Performed by: FAMILY MEDICINE

## 2019-09-11 PROCEDURE — 99213 OFFICE O/P EST LOW 20 MIN: CPT | Performed by: FAMILY MEDICINE

## 2019-09-11 NOTE — PROGRESS NOTES
Assessment/Plan:    No problem-specific Assessment & Plan notes found for this encounter  Diagnoses and all orders for this visit:    Upper respiratory tract infection, unspecified type  Likely viral based on symptoms and exam findings  Should be self-limited  No exam findings that would suggest an otitis media, sinusitis, strep pharyngitis or pneumonia  Over-the-counter cold medication or pain medication for symptom relief  Call office if new symptoms developed, fever develops or symptoms worsen  Follow-up in one week if symptoms persist or sooner if needed  Subjective:      Patient ID: Cortney Valadez is a 19 y o  male  Patient presents with sore throat, runny nose, cough and body aches that started yesterday  No fever  No shortness of breath  No chest pain  Has been using ibuprofen with good relief of his sore throat and body aches  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 contact with multiple people throughout the day but is unaware of any sick contacts  The following portions of the patient's history were reviewed and updated as appropriate:   He  has a past medical history of ADHD and Ankle fracture, right (07/28/2018)  He   Patient Active Problem List    Diagnosis Date Noted    Closed trimalleolar fracture of right ankle 03/15/2019    Pain in right foot 03/15/2019    Trimalleolar fracture of ankle, closed, right, initial encounter 07/30/2018    Bee sting 05/31/2016    Cellulitis of left upper arm 05/31/2016    Acute pharyngitis 02/03/2016    Headache 02/03/2016    Chest wall pain 01/26/2015    Orthostatic hypotension 01/26/2015    Wart 01/26/2015    Functional heart murmur 10/13/2014    Late talker 01/15/2013     He  has a past surgical history that includes Hernia repair  His family history is not on file  He  reports that he has never smoked  He has never used smokeless tobacco  He reports that he drinks alcohol   He reports that he does not use drugs   Current Outpatient Medications   Medication Sig Dispense Refill    VYVANSE 40 MG capsule TAKE 1 CAPSULE BY MOUTH EVERY DAY IN THE MORNING  0    ibuprofen (MOTRIN) 600 mg tablet Take 1 tablet (600 mg total) by mouth every 6 (six) hours as needed for mild pain for up to 15 days 15 tablet 0     No current facility-administered medications for this visit  Current Outpatient Medications on File Prior to Visit   Medication Sig    VYVANSE 40 MG capsule TAKE 1 CAPSULE BY MOUTH EVERY DAY IN THE MORNING    ibuprofen (MOTRIN) 600 mg tablet Take 1 tablet (600 mg total) by mouth every 6 (six) hours as needed for mild pain for up to 15 days     No current facility-administered medications on file prior to visit  He has No Known Allergies       Review of Systems   Constitutional: Negative for fever  Respiratory: Negative for shortness of breath  Cardiovascular: Negative for chest pain  Objective:      /72   Pulse 60   Temp 98 8 °F (37 1 °C) (Oral)   Resp 14   Ht 6' (1 829 m)   Wt 83 6 kg (184 lb 6 oz)   BMI 25 01 kg/m²          Physical Exam   Constitutional: Vital signs are normal  He appears well-developed and well-nourished  HENT:   Head: Normocephalic and atraumatic  Right Ear: Tympanic membrane, external ear and ear canal normal  Right ear middle ear effusion: Serous fluid of the middle ear  Left Ear: Tympanic membrane, external ear and ear canal normal  Left ear middle ear effusion: Serous fluid of the middle ear  Nose: Mucosal edema and rhinorrhea present  Mouth/Throat: Uvula is midline and mucous membranes are normal  No oropharyngeal exudate (Postnasal drainage is however present  )  Eyes: Pupils are equal, round, and reactive to light  Conjunctivae, EOM and lids are normal    Neck: Trachea normal    Cardiovascular: Normal rate, regular rhythm and normal heart sounds  No murmur heard    Pulmonary/Chest: Effort normal and breath sounds normal    Lymphadenopathy: He has cervical adenopathy  Right cervical: Superficial cervical adenopathy present  Left cervical: Superficial cervical adenopathy present

## 2020-03-08 ENCOUNTER — OFFICE VISIT (OUTPATIENT)
Dept: URGENT CARE | Facility: MEDICAL CENTER | Age: 20
End: 2020-03-08
Payer: COMMERCIAL

## 2020-03-08 VITALS
SYSTOLIC BLOOD PRESSURE: 110 MMHG | OXYGEN SATURATION: 98 % | DIASTOLIC BLOOD PRESSURE: 68 MMHG | BODY MASS INDEX: 24.92 KG/M2 | RESPIRATION RATE: 18 BRPM | HEIGHT: 72 IN | TEMPERATURE: 97.5 F | HEART RATE: 68 BPM | WEIGHT: 184 LBS

## 2020-03-08 DIAGNOSIS — J11.1 INFLUENZA-LIKE ILLNESS: Primary | ICD-10-CM

## 2020-03-08 PROCEDURE — 99213 OFFICE O/P EST LOW 20 MIN: CPT | Performed by: PHYSICIAN ASSISTANT

## 2020-03-08 PROCEDURE — S9088 SERVICES PROVIDED IN URGENT: HCPCS | Performed by: PHYSICIAN ASSISTANT

## 2020-03-08 RX ORDER — BENZONATATE 200 MG/1
200 CAPSULE ORAL 3 TIMES DAILY PRN
Qty: 20 CAPSULE | Refills: 0 | Status: SHIPPED | OUTPATIENT
Start: 2020-03-08 | End: 2020-07-15

## 2020-03-08 NOTE — PATIENT INSTRUCTIONS
1  Motrin as needed for fever  2  Increase fluids  3  Take Tessalon 200mg  Up to 3x daily as needed for cough  4   Follow up with PCP in 3-5 days if symptoms persist

## 2020-03-08 NOTE — PROGRESS NOTES
[address] [facility]        NAME: Carlos Edmond is a 22 y o  male  : 2000    MRN: [id number]  DATE: 2020  TIME: 5:25 PM    Assessment and Plan   Influenza-like illness [R69]  1  Influenza-like illness  benzonatate (TESSALON) 200 MG capsule         Patient Instructions     1  Motrin as needed for fever  2  Increase fluids  3  Take Tessalon 200mg  Up to 3x daily as needed for cough  4  Follow up with PCP in 3-5 days if symptoms persist     Chief Complaint     Chief Complaint   Patient presents with    Cold Like Symptoms     Pt  with flu-like symptoms for the past 5 days  No fever  History of Present Illness       Paulette Miller is a 28-year-old male presents with a 5 day history of acute onset fever, chills, body aches, nasal discharge and cough  Patient did report 1 episode of vomiting at the onset of his symptoms but no vomiting or diarrhea over the past 2 days  He reports continue nasal discharge and cough  Review of Systems   Review of Systems   Constitutional: Positive for chills and fatigue  HENT: Positive for congestion, postnasal drip and rhinorrhea  Respiratory: Positive for cough  Gastrointestinal: Negative            Current Medications       Current Outpatient Medications:     VYVANSE 40 MG capsule, TAKE 1 CAPSULE BY MOUTH EVERY DAY IN THE MORNING, Disp: , Rfl: 0    benzonatate (TESSALON) 200 MG capsule, Take 1 capsule (200 mg total) by mouth 3 (three) times a day as needed for cough, Disp: 20 capsule, Rfl: 0    ibuprofen (MOTRIN) 600 mg tablet, Take 1 tablet (600 mg total) by mouth every 6 (six) hours as needed for mild pain for up to 15 days, Disp: 15 tablet, Rfl: 0    Current Allergies     Allergies as of 2020    (No Known Allergies)            The following portions of the patient's history were reviewed and updated as appropriate: allergies, current medications, past family history, past medical history, past social history, past surgical history and problem list      Past Medical History:   Diagnosis Date    ADHD     Ankle fracture, right 07/28/2018       Past Surgical History:   Procedure Laterality Date    HERNIA REPAIR         No family history on file  Medications have been verified  Objective   /68   Pulse 68   Temp 97 5 °F (36 4 °C) (Temporal)   Resp 18   Ht 6' (1 829 m)   Wt 83 5 kg (184 lb)   SpO2 98%   BMI 24 95 kg/m²        Physical Exam     Physical Exam   Constitutional: He appears well-developed and well-nourished  HENT:   Head: Normocephalic and atraumatic  Right Ear: Tympanic membrane and ear canal normal    Left Ear: Tympanic membrane and ear canal normal    Nose: Rhinorrhea present  Mouth/Throat: Uvula is midline, oropharynx is clear and moist and mucous membranes are normal    Cardiovascular: Normal rate, regular rhythm and normal heart sounds  No murmur heard    Pulmonary/Chest: Effort normal and breath sounds normal

## 2020-07-15 ENCOUNTER — OFFICE VISIT (OUTPATIENT)
Dept: FAMILY MEDICINE CLINIC | Facility: MEDICAL CENTER | Age: 20
End: 2020-07-15
Payer: COMMERCIAL

## 2020-07-15 VITALS
HEIGHT: 72 IN | RESPIRATION RATE: 16 BRPM | BODY MASS INDEX: 24.24 KG/M2 | HEART RATE: 68 BPM | DIASTOLIC BLOOD PRESSURE: 64 MMHG | SYSTOLIC BLOOD PRESSURE: 112 MMHG | TEMPERATURE: 97.8 F | WEIGHT: 179 LBS

## 2020-07-15 DIAGNOSIS — R53.83 FATIGUE, UNSPECIFIED TYPE: Primary | ICD-10-CM

## 2020-07-15 PROCEDURE — 1036F TOBACCO NON-USER: CPT | Performed by: FAMILY MEDICINE

## 2020-07-15 PROCEDURE — 3008F BODY MASS INDEX DOCD: CPT | Performed by: FAMILY MEDICINE

## 2020-07-15 PROCEDURE — 99214 OFFICE O/P EST MOD 30 MIN: CPT | Performed by: FAMILY MEDICINE

## 2020-07-16 NOTE — PROGRESS NOTES
Patient was asked by his mother to come and see me for fatigue and some mild weight loss  She is concerned about thyroid  He works several jobs and goes stay at least a day or two a week where he does not get any sleep over 24 hour period of time  He works at [employer] an does a lot heavy work, he also is a fair ear and shoes horses  Review of systems for GI  cardiac pulmonary and neurologic systems are all negative  ENT review of systems is also negative  Fatigue on days he does not sleep    /64 (BP Location: Left arm, Patient Position: Sitting, Cuff Size: Adult)   Pulse 68   Temp 97 8 °F (36 6 °C) (Tympanic)   Resp 16   Ht 6' (1 829 m)   Wt 81 2 kg (179 lb)   BMI 24 28 kg/m²     HEENT examination is within normal limits no acute findings  Neck was supple  Chest clear  Cardiac exam revealed a regular rate and rhythm without murmur rub or gallop  Abdomen is soft and nontender  In good spirit, cooperative  Will check thyroid, blood count and blood sugar

## 2020-07-18 ENCOUNTER — APPOINTMENT (OUTPATIENT)
Dept: LAB | Facility: MEDICAL CENTER | Age: 20
End: 2020-07-18
Payer: COMMERCIAL

## 2020-07-18 DIAGNOSIS — R53.83 FATIGUE, UNSPECIFIED TYPE: ICD-10-CM

## 2020-07-18 LAB
ANION GAP SERPL CALCULATED.3IONS-SCNC: 6 MMOL/L (ref 4–13)
BASOPHILS # BLD AUTO: 0.06 THOUSANDS/ΜL (ref 0–0.1)
BASOPHILS NFR BLD AUTO: 1 % (ref 0–1)
BUN SERPL-MCNC: 14 MG/DL (ref 5–25)
CALCIUM SERPL-MCNC: 9 MG/DL (ref 8.3–10.1)
CHLORIDE SERPL-SCNC: 106 MMOL/L (ref 100–108)
CO2 SERPL-SCNC: 28 MMOL/L (ref 21–32)
CREAT SERPL-MCNC: 0.94 MG/DL (ref 0.6–1.3)
EOSINOPHIL # BLD AUTO: 0.08 THOUSAND/ΜL (ref 0–0.61)
EOSINOPHIL NFR BLD AUTO: 1 % (ref 0–6)
ERYTHROCYTE [DISTWIDTH] IN BLOOD BY AUTOMATED COUNT: 12.1 % (ref 11.6–15.1)
GFR SERPL CREATININE-BSD FRML MDRD: 115 ML/MIN/1.73SQ M
GLUCOSE P FAST SERPL-MCNC: 80 MG/DL (ref 65–99)
HCT VFR BLD AUTO: 44.7 % (ref 36.5–49.3)
HGB BLD-MCNC: 14.9 G/DL (ref 12–17)
IMM GRANULOCYTES # BLD AUTO: 0.03 THOUSAND/UL (ref 0–0.2)
IMM GRANULOCYTES NFR BLD AUTO: 0 % (ref 0–2)
LYMPHOCYTES # BLD AUTO: 3.73 THOUSANDS/ΜL (ref 0.6–4.47)
LYMPHOCYTES NFR BLD AUTO: 38 % (ref 14–44)
MCH RBC QN AUTO: 29.3 PG (ref 26.8–34.3)
MCHC RBC AUTO-ENTMCNC: 33.3 G/DL (ref 31.4–37.4)
MCV RBC AUTO: 88 FL (ref 82–98)
MONOCYTES # BLD AUTO: 0.74 THOUSAND/ΜL (ref 0.17–1.22)
MONOCYTES NFR BLD AUTO: 8 % (ref 4–12)
NEUTROPHILS # BLD AUTO: 5.2 THOUSANDS/ΜL (ref 1.85–7.62)
NEUTS SEG NFR BLD AUTO: 52 % (ref 43–75)
NRBC BLD AUTO-RTO: 0 /100 WBCS
PLATELET # BLD AUTO: 221 THOUSANDS/UL (ref 149–390)
PMV BLD AUTO: 10.3 FL (ref 8.9–12.7)
POTASSIUM SERPL-SCNC: 3.6 MMOL/L (ref 3.5–5.3)
RBC # BLD AUTO: 5.08 MILLION/UL (ref 3.88–5.62)
SODIUM SERPL-SCNC: 140 MMOL/L (ref 136–145)
TSH SERPL DL<=0.05 MIU/L-ACNC: 0.98 UIU/ML (ref 0.36–3.74)
WBC # BLD AUTO: 9.84 THOUSAND/UL (ref 4.31–10.16)

## 2020-07-18 PROCEDURE — 85025 COMPLETE CBC W/AUTO DIFF WBC: CPT

## 2020-07-18 PROCEDURE — 80048 BASIC METABOLIC PNL TOTAL CA: CPT

## 2020-07-18 PROCEDURE — 84443 ASSAY THYROID STIM HORMONE: CPT

## 2020-07-18 PROCEDURE — 36415 COLL VENOUS BLD VENIPUNCTURE: CPT

## 2020-07-29 ENCOUNTER — TELEPHONE (OUTPATIENT)
Dept: FAMILY MEDICINE CLINIC | Facility: MEDICAL CENTER | Age: 20
End: 2020-07-29

## 2021-12-13 ENCOUNTER — TELEPHONE (OUTPATIENT)
Dept: FAMILY MEDICINE CLINIC | Facility: MEDICAL CENTER | Age: 21
End: 2021-12-13

## 2021-12-13 DIAGNOSIS — J02.9 SORE THROAT: Primary | ICD-10-CM

## 2021-12-13 DIAGNOSIS — R09.81 CONGESTION OF PARANASAL SINUS: ICD-10-CM

## 2021-12-13 PROCEDURE — U0003 INFECTIOUS AGENT DETECTION BY NUCLEIC ACID (DNA OR RNA); SEVERE ACUTE RESPIRATORY SYNDROME CORONAVIRUS 2 (SARS-COV-2) (CORONAVIRUS DISEASE [COVID-19]), AMPLIFIED PROBE TECHNIQUE, MAKING USE OF HIGH THROUGHPUT TECHNOLOGIES AS DESCRIBED BY CMS-2020-01-R: HCPCS | Performed by: FAMILY MEDICINE

## 2021-12-13 PROCEDURE — U0005 INFEC AGEN DETEC AMPLI PROBE: HCPCS | Performed by: FAMILY MEDICINE

## 2023-04-28 ENCOUNTER — OFFICE VISIT (OUTPATIENT)
Dept: URGENT CARE | Facility: MEDICAL CENTER | Age: 23
End: 2023-04-28

## 2023-04-28 ENCOUNTER — APPOINTMENT (OUTPATIENT)
Dept: LAB | Facility: MEDICAL CENTER | Age: 23
End: 2023-04-28

## 2023-04-28 VITALS
HEIGHT: 72 IN | HEART RATE: 72 BPM | SYSTOLIC BLOOD PRESSURE: 118 MMHG | RESPIRATION RATE: 18 BRPM | WEIGHT: 193 LBS | OXYGEN SATURATION: 97 % | DIASTOLIC BLOOD PRESSURE: 57 MMHG | TEMPERATURE: 98 F | BODY MASS INDEX: 26.14 KG/M2

## 2023-04-28 DIAGNOSIS — F90.0 ADHD, PREDOMINANTLY INATTENTIVE TYPE: ICD-10-CM

## 2023-04-28 DIAGNOSIS — Z48.02 ENCOUNTER FOR REMOVAL OF SUTURES: Primary | ICD-10-CM

## 2023-04-28 LAB
ALBUMIN SERPL BCP-MCNC: 4.2 G/DL (ref 3.5–5)
ALP SERPL-CCNC: 67 U/L (ref 46–116)
ALT SERPL W P-5'-P-CCNC: 24 U/L (ref 12–78)
ANION GAP SERPL CALCULATED.3IONS-SCNC: 1 MMOL/L (ref 4–13)
AST SERPL W P-5'-P-CCNC: 19 U/L (ref 5–45)
BASOPHILS # BLD AUTO: 0.04 THOUSANDS/ΜL (ref 0–0.1)
BASOPHILS NFR BLD AUTO: 1 % (ref 0–1)
BILIRUB SERPL-MCNC: 1.46 MG/DL (ref 0.2–1)
BUN SERPL-MCNC: 11 MG/DL (ref 5–25)
CALCIUM SERPL-MCNC: 9.3 MG/DL (ref 8.3–10.1)
CHLORIDE SERPL-SCNC: 108 MMOL/L (ref 96–108)
CHOLEST SERPL-MCNC: 177 MG/DL
CO2 SERPL-SCNC: 30 MMOL/L (ref 21–32)
CREAT SERPL-MCNC: 1.01 MG/DL (ref 0.6–1.3)
EOSINOPHIL # BLD AUTO: 0.06 THOUSAND/ΜL (ref 0–0.61)
EOSINOPHIL NFR BLD AUTO: 1 % (ref 0–6)
ERYTHROCYTE [DISTWIDTH] IN BLOOD BY AUTOMATED COUNT: 11.9 % (ref 11.6–15.1)
GFR SERPL CREATININE-BSD FRML MDRD: 103 ML/MIN/1.73SQ M
GLUCOSE P FAST SERPL-MCNC: 92 MG/DL (ref 65–99)
HCT VFR BLD AUTO: 46.8 % (ref 36.5–49.3)
HDLC SERPL-MCNC: 35 MG/DL
HGB BLD-MCNC: 16 G/DL (ref 12–17)
IMM GRANULOCYTES # BLD AUTO: 0.04 THOUSAND/UL (ref 0–0.2)
IMM GRANULOCYTES NFR BLD AUTO: 1 % (ref 0–2)
LDLC SERPL CALC-MCNC: 124 MG/DL (ref 0–100)
LYMPHOCYTES # BLD AUTO: 2.7 THOUSANDS/ΜL (ref 0.6–4.47)
LYMPHOCYTES NFR BLD AUTO: 34 % (ref 14–44)
MCH RBC QN AUTO: 30.1 PG (ref 26.8–34.3)
MCHC RBC AUTO-ENTMCNC: 34.2 G/DL (ref 31.4–37.4)
MCV RBC AUTO: 88 FL (ref 82–98)
MONOCYTES # BLD AUTO: 0.54 THOUSAND/ΜL (ref 0.17–1.22)
MONOCYTES NFR BLD AUTO: 7 % (ref 4–12)
NEUTROPHILS # BLD AUTO: 4.62 THOUSANDS/ΜL (ref 1.85–7.62)
NEUTS SEG NFR BLD AUTO: 56 % (ref 43–75)
NONHDLC SERPL-MCNC: 142 MG/DL
NRBC BLD AUTO-RTO: 0 /100 WBCS
PLATELET # BLD AUTO: 226 THOUSANDS/UL (ref 149–390)
PMV BLD AUTO: 10.2 FL (ref 8.9–12.7)
POTASSIUM SERPL-SCNC: 4.4 MMOL/L (ref 3.5–5.3)
PROT SERPL-MCNC: 7.4 G/DL (ref 6.4–8.4)
RBC # BLD AUTO: 5.31 MILLION/UL (ref 3.88–5.62)
SODIUM SERPL-SCNC: 139 MMOL/L (ref 135–147)
T4 FREE SERPL-MCNC: 0.72 NG/DL (ref 0.76–1.46)
TRIGL SERPL-MCNC: 91 MG/DL
TSH SERPL DL<=0.05 MIU/L-ACNC: 0.98 UIU/ML (ref 0.45–4.5)
WBC # BLD AUTO: 8 THOUSAND/UL (ref 4.31–10.16)

## 2023-04-28 RX ORDER — LISDEXAMFETAMINE DIMESYLATE 50 MG
CAPSULE ORAL
COMMUNITY
Start: 2023-03-30

## 2023-04-28 NOTE — PROGRESS NOTES
[address] [facility]        NAME: Keith Blum is a 23 y o  male  : 2000    MRN: [id number]  DATE: 2023  TIME: 5:25 PM    Assessment and Plan   Encounter for removal of sutures [Z48 02]  1  Encounter for removal of sutures              Patient Instructions           Chief Complaint     Chief Complaint   Patient presents with   • Suture / Staple Removal     Suture removal removal to left thumb; denies drainage or pain at this time; placed on           History of Present Illness       68-year-old male patient who had a laceration repair done here on 2023 for a laceration of his left thumb  Patient denies any wound complaints, subjective signs of infection  Patient presents for reevaluation and possible suture removal       Review of Systems   Review of Systems   Constitutional: Negative for chills and fever  HENT: Negative for ear pain and sore throat  Eyes: Negative for pain and visual disturbance  Respiratory: Negative for cough and shortness of breath  Cardiovascular: Negative for chest pain and palpitations  Gastrointestinal: Negative for abdominal pain and vomiting  Genitourinary: Negative for dysuria and hematuria  Musculoskeletal: Negative for arthralgias and back pain  Skin: Positive for wound  Negative for color change and rash  Neurological: Negative for seizures and syncope  All other systems reviewed and are negative          Current Medications       Current Outpatient Medications:   •  ibuprofen (MOTRIN) 600 mg tablet, Take 1 tablet (600 mg total) by mouth every 6 (six) hours as needed for mild pain for up to 15 days, Disp: 15 tablet, Rfl: 0  •  VYVANSE 40 MG capsule, TAKE 1 CAPSULE BY MOUTH EVERY DAY IN THE MORNING, Disp: , Rfl: 0  •  Vyvanse 50 MG capsule, TAKE 1 CAPSULE BY MOUTH EVERY DAY IN THE MORNING, Disp: , Rfl:     Current Allergies     Allergies as of 2023   • (No Known Allergies)            The following portions of the patient's history were reviewed and updated as appropriate: allergies, current medications, past family history, past medical history, past social history, past surgical history and problem list      Past Medical History:   Diagnosis Date   • ADHD    • Ankle fracture, right 07/28/2018       Past Surgical History:   Procedure Laterality Date   • HERNIA REPAIR         No family history on file  Medications have been verified  Objective   /57   Pulse 72   Temp 98 °F (36 7 °C) (Temporal)   Resp 18   Ht 6' (1 829 m)   Wt 87 5 kg (193 lb)   SpO2 97%   BMI 26 18 kg/m²        Physical Exam     Physical Exam  Vitals and nursing note reviewed  Constitutional:       Appearance: Normal appearance  HENT:      Head: Normocephalic  Nose: Nose normal       Mouth/Throat:      Mouth: Mucous membranes are dry  Pharynx: Oropharynx is clear  Eyes:      Conjunctiva/sclera: Conjunctivae normal       Pupils: Pupils are equal, round, and reactive to light  Cardiovascular:      Rate and Rhythm: Normal rate and regular rhythm  Pulses: Normal pulses  Pulmonary:      Effort: Pulmonary effort is normal       Breath sounds: Normal breath sounds  Musculoskeletal:         General: Normal range of motion  Cervical back: Normal range of motion and neck supple  Skin:     General: Skin is warm and dry  Capillary Refill: Capillary refill takes less than 2 seconds  Comments: Well-healing laceration to the left thumb noted with intact sutures  No redness, swelling, tenderness  IP and MCP joint exhibit full range of motion without deficit or discomfort  Neurological:      Mental Status: He is alert and oriented to person, place, and time     Psychiatric:         Mood and Affect: Mood normal          Behavior: Behavior normal               Suture removal    Date/Time: 4/28/2023 4:55 PM  Performed by: Tammi Almonte PA-C  Authorized by: Tammi Almonte PA-C   Universal Protocol:  Consent: Verbal consent obtained  Risks and benefits: risks, benefits and alternatives were discussed  Consent given by: patient  Patient understanding: patient states understanding of the procedure being performed  Patient identity confirmed: verbally with patient        Patient location:  Clinic  Location:     Laterality:  Left    Location:  Upper extremity    Upper extremity location:  Hand    Hand location:  L thumb  Procedure details: Tools used:  Suture removal kit    Wound appearance:  No sign(s) of infection, good wound healing and clean    Number of sutures removed:  7  Post-procedure details:     Post-removal:  Antibiotic ointment applied and Band-Aid applied    Patient tolerance of procedure:   Tolerated well, no immediate complications

## 2023-05-04 ENCOUNTER — TELEPHONE (OUTPATIENT)
Dept: FAMILY MEDICINE CLINIC | Facility: MEDICAL CENTER | Age: 23
End: 2023-05-04

## 2023-05-04 NOTE — TELEPHONE ENCOUNTER
Mom called pt's phys who prescribes his ADHD meds sent him for bw, and wanted pt to reach out to you re: his cholesterol and liver enzymes were elevated

## 2023-08-16 ENCOUNTER — TELEPHONE (OUTPATIENT)
Dept: FAMILY MEDICINE CLINIC | Facility: MEDICAL CENTER | Age: 23
End: 2023-08-16

## 2023-08-16 NOTE — TELEPHONE ENCOUNTER
Spoke with mom- concerned since one of the side effects with Vyvance is effecting the liver. He has been on it for 10 year. Any suggestion to recheck it ? Discuss with Psych holding the medication and reassessing ?  Please advise

## 2023-08-16 NOTE — TELEPHONE ENCOUNTER
Pt went to psych and the psychiatrist who prescribed Getachewgeovanni Rezam is concerned about his bilirubin. Mother said that you were not concerned so she is asking if you can look it over and see if pt needs to have this rechecked again. Please, advise. Mother said to call the house number after 2:35.

## 2023-08-16 NOTE — TELEPHONE ENCOUNTER
It is nothing to worry about. 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 It is just that some people have elevated bilirubin. It usually starts in young adulthood. There are no symptoms and nothing has to change.

## 2024-03-18 ENCOUNTER — OFFICE VISIT (OUTPATIENT)
Dept: URGENT CARE | Facility: MEDICAL CENTER | Age: 24
End: 2024-03-18
Payer: COMMERCIAL

## 2024-03-18 VITALS
TEMPERATURE: 98.2 F | HEIGHT: 72 IN | HEART RATE: 72 BPM | BODY MASS INDEX: 27.09 KG/M2 | DIASTOLIC BLOOD PRESSURE: 70 MMHG | WEIGHT: 200 LBS | RESPIRATION RATE: 18 BRPM | OXYGEN SATURATION: 97 % | SYSTOLIC BLOOD PRESSURE: 116 MMHG

## 2024-03-18 DIAGNOSIS — L30.9 DERMATITIS: Primary | ICD-10-CM

## 2024-03-18 PROCEDURE — 99213 OFFICE O/P EST LOW 20 MIN: CPT | Performed by: PHYSICIAN ASSISTANT

## 2024-03-18 RX ORDER — DEXMETHYLPHENIDATE HYDROCHLORIDE 10 MG/1
10 CAPSULE, EXTENDED RELEASE ORAL DAILY
COMMUNITY
Start: 2024-03-07

## 2024-03-18 RX ORDER — CEPHALEXIN 500 MG/1
500 CAPSULE ORAL EVERY 6 HOURS SCHEDULED
Qty: 28 CAPSULE | Refills: 0 | Status: SHIPPED | OUTPATIENT
Start: 2024-03-18 | End: 2024-03-25

## 2024-03-18 RX ORDER — METHYLPREDNISOLONE 4 MG/1
TABLET ORAL
Qty: 21 TABLET | Refills: 0 | Status: SHIPPED | OUTPATIENT
Start: 2024-03-18

## 2024-03-18 NOTE — PATIENT INSTRUCTIONS
Dermatitis  Medrol Dosepak as directed  Keflex as directed  Discontinue use of deodorant until issue resolves  Follow up with PCP in 3-5 days.  Proceed to  ER if symptoms worsen.

## 2024-03-18 NOTE — PROGRESS NOTES
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 Dermatitis  methylPREDNISolone 4 MG tablet therapy pack    cephalexin (KEFLEX) 500 mg capsule            Patient Instructions     Dermatitis  Medrol Dosepak as directed  Keflex as directed  Discontinue use of deodorant until issue resolves  Follow up with PCP in 3-5 days.  Proceed to  ER if symptoms worsen.    Chief Complaint     Chief Complaint   Patient presents with    Rash     Pt. With a rash to B/L armpits that began about 3 days ago. Denies any new products including Deoderant. He began to take Dexmethylphenidate on 3/7/24         History of Present Illness       23-year-old male who presents complaining of painful rash to bilateral axilla.  Patient states that he has been the rash on and off times several weeks.  Denies fevers, chills, itching, discharge.    Rash  Pertinent negatives include no cough or shortness of breath.       Review of Systems   Review of Systems   Constitutional: Negative.    HENT: Negative.     Eyes: Negative.    Respiratory: Negative.  Negative for apnea, cough, choking, chest tightness, shortness of breath, wheezing and stridor.    Cardiovascular: Negative.  Negative for chest pain.   Skin:  Positive for rash.         Current Medications       Current Outpatient Medications:     cephalexin (KEFLEX) 500 mg capsule, Take 1 capsule (500 mg total) by mouth every 6 (six) hours for 7 days, Disp: 28 capsule, Rfl: 0    dexmethylphenidate (FOCALIN XR) 10 MG 24 hr capsule, Take 10 mg by mouth daily, Disp: , Rfl:     methylPREDNISolone 4 MG tablet therapy pack, Use as directed on package, Disp: 21 tablet, Rfl: 0    ibuprofen (MOTRIN) 600 mg tablet, Take 1 tablet (600 mg total) by mouth every 6 (six) hours as needed for mild pain for up to 15 days (Patient not taking: Reported on 3/18/2024), Disp: 15 tablet, Rfl:  0    VYVANSE 40 MG capsule, TAKE 1 CAPSULE BY MOUTH EVERY DAY IN THE MORNING (Patient not taking: Reported on 3/18/2024), Disp: , Rfl: 0    Vyvanse 50 MG capsule, TAKE 1 CAPSULE BY MOUTH EVERY DAY IN THE MORNING (Patient not taking: Reported on 3/18/2024), Disp: , Rfl:     Current Allergies     Allergies as of 03/18/2024    (No Known Allergies)            The following portions of the patient's history were reviewed and updated as appropriate: allergies, current medications, past family history, past medical history, past social history, past surgical history and problem list.     Past Medical History:   Diagnosis Date    ADHD     Ankle fracture, right 07/28/2018       Past Surgical History:   Procedure Laterality Date    HERNIA REPAIR         Family History   Problem Relation Age of Onset    No Known Problems Mother     No Known Problems Father          Medications have been verified.        Objective   /70   Pulse 72   Temp 98.2 °F (36.8 °C)   Resp 18   Ht 6' (1.829 m)   Wt 90.7 kg (200 lb)   SpO2 97%   BMI 27.12 kg/m²        Physical Exam     Physical Exam  Constitutional:       General: He is not in acute distress.     Appearance: He is well-developed. He is not diaphoretic.   Cardiovascular:      Rate and Rhythm: Normal rate and regular rhythm.      Heart sounds: Normal heart sounds.   Pulmonary:      Effort: Pulmonary effort is normal. No respiratory distress.      Breath sounds: Normal breath sounds. No wheezing or rales.   Chest:      Chest wall: No tenderness.   Musculoskeletal:      Cervical back: Normal range of motion and neck supple.   Lymphadenopathy:      Cervical: No cervical adenopathy.   Skin:

## 2024-09-16 ENCOUNTER — OFFICE VISIT (OUTPATIENT)
Dept: URGENT CARE | Facility: MEDICAL CENTER | Age: 24
End: 2024-09-16
Payer: COMMERCIAL

## 2024-09-16 VITALS
TEMPERATURE: 97.6 F | BODY MASS INDEX: 28.37 KG/M2 | SYSTOLIC BLOOD PRESSURE: 116 MMHG | RESPIRATION RATE: 18 BRPM | OXYGEN SATURATION: 99 % | DIASTOLIC BLOOD PRESSURE: 81 MMHG | WEIGHT: 209.2 LBS | HEART RATE: 83 BPM

## 2024-09-16 DIAGNOSIS — M54.50 ACUTE BILATERAL LOW BACK PAIN, UNSPECIFIED WHETHER SCIATICA PRESENT: Primary | ICD-10-CM

## 2024-09-16 PROCEDURE — 99213 OFFICE O/P EST LOW 20 MIN: CPT | Performed by: PHYSICIAN ASSISTANT

## 2024-09-16 RX ORDER — METHOCARBAMOL 500 MG/1
500 TABLET, FILM COATED ORAL 4 TIMES DAILY
Qty: 20 TABLET | Refills: 0 | Status: SHIPPED | OUTPATIENT
Start: 2024-09-16 | End: 2024-09-21

## 2024-09-16 RX ORDER — NAPROXEN 500 MG/1
500 TABLET ORAL 2 TIMES DAILY WITH MEALS
Qty: 20 TABLET | Refills: 0 | Status: SHIPPED | OUTPATIENT
Start: 2024-09-16 | End: 2024-09-26

## 2024-09-16 NOTE — LETTER
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 He may return to work on 9/19/2024 .    If you have any questions or concerns, please don't hesitate to call.         Sincerely,          Dante Stapleton PA-C        CC: No Recipients

## 2024-09-16 NOTE — PATIENT INSTRUCTIONS
Patient Instructions     Low back  pain  Robaxin as directed- may become drowsy  Naprosyn twice daily  Follow up with PCP in 3-5 days.  Proceed to  ER if symptoms worsen.

## 2024-09-16 NOTE — PROGRESS NOTES
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 Acute bilateral low back pain, unspecified whether sciatica present              Patient Instructions     Low back  pain  Robaxin as directed- may become drowsy  Naprosyn twice daily  Follow up with PCP in 3-5 days.  Proceed to  ER if symptoms worsen.    Chief Complaint     Chief Complaint   Patient presents with    Back Pain     C/o pain in lower back for 5 days.          History of Present Illness       23-year-old male who presents complaining of lower back pain.  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  Denies direct trauma, fall, fevers, radiation of pain, urinary/fecal incontinence.    Back Pain        Review of Systems   Review of Systems   Musculoskeletal:  Positive for back pain.         Current Medications       Current Outpatient Medications:     dexmethylphenidate (FOCALIN XR) 10 MG 24 hr capsule, Take 10 mg by mouth daily, Disp: , Rfl:     ibuprofen (MOTRIN) 600 mg tablet, Take 1 tablet (600 mg total) by mouth every 6 (six) hours as needed for mild pain for up to 15 days (Patient not taking: Reported on 3/18/2024), Disp: 15 tablet, Rfl: 0    methylPREDNISolone 4 MG tablet therapy pack, Use as directed on package (Patient not taking: Reported on 2024), Disp: 21 tablet, Rfl: 0    VYVANSE 40 MG capsule, TAKE 1 CAPSULE BY MOUTH EVERY DAY IN THE MORNING (Patient not taking: Reported on 3/18/2024), Disp: , Rfl: 0    Vyvanse 50 MG capsule, TAKE 1 CAPSULE BY MOUTH EVERY DAY IN THE MORNING (Patient not taking: Reported on 3/18/2024), Disp: , Rfl:     Current Allergies     Allergies as of 2024    (No Known Allergies)             The following portions of the patient's history were reviewed and updated as appropriate: allergies, current medications, past family history, past medical history, past social history, past surgical history and problem list.     Past Medical History:   Diagnosis Date    ADHD     Ankle fracture, right 07/28/2018       Past Surgical History:   Procedure Laterality Date    HERNIA REPAIR         Family History   Problem Relation Age of Onset    No Known Problems Mother     No Known Problems Father          Medications have been verified.        Objective   /81   Pulse 83   Temp 97.6 °F (36.4 °C)   Resp 18   Wt 94.9 kg (209 lb 3.2 oz)   SpO2 99%   BMI 28.37 kg/m²        Physical Exam     Physical Exam  Constitutional:       General: He is not in acute distress.     Appearance: Normal appearance. He is well-developed. He is not diaphoretic.   Cardiovascular:      Rate and Rhythm: Normal rate and regular rhythm.      Heart sounds: Normal heart sounds.   Pulmonary:      Effort: Pulmonary effort is normal. No respiratory distress.      Breath sounds: Normal breath sounds. No stridor. No wheezing, rhonchi or rales.   Chest:      Chest wall: No tenderness.   Musculoskeletal:      Cervical back: Normal, normal range of motion and neck supple.      Thoracic back: Normal.      Lumbar back: Spasms and tenderness present. No swelling, edema, deformity, signs of trauma, lacerations or bony tenderness. Decreased range of motion. Negative right straight leg raise test and negative left straight leg raise test. No scoliosis.   Lymphadenopathy:      Cervical: No cervical adenopathy.   Neurological:      Mental Status: He is alert.

## 2025-03-04 ENCOUNTER — OFFICE VISIT (OUTPATIENT)
Dept: URGENT CARE | Facility: MEDICAL CENTER | Age: 25
End: 2025-03-04
Payer: COMMERCIAL

## 2025-03-04 VITALS
HEART RATE: 72 BPM | SYSTOLIC BLOOD PRESSURE: 140 MMHG | WEIGHT: 210 LBS | RESPIRATION RATE: 18 BRPM | BODY MASS INDEX: 28.48 KG/M2 | DIASTOLIC BLOOD PRESSURE: 82 MMHG | OXYGEN SATURATION: 96 % | TEMPERATURE: 97.5 F

## 2025-03-04 DIAGNOSIS — S53.409A ELBOW SPRAIN, UNSPECIFIED LATERALITY, INITIAL ENCOUNTER: Primary | ICD-10-CM

## 2025-03-04 PROCEDURE — 99213 OFFICE O/P EST LOW 20 MIN: CPT | Performed by: PHYSICIAN ASSISTANT

## 2025-03-04 RX ORDER — TERBINAFINE HYDROCHLORIDE 250 MG/1
1 TABLET ORAL DAILY
COMMUNITY
Start: 2025-02-20

## 2025-03-04 RX ORDER — KETOCONAZOLE 20 MG/G
CREAM TOPICAL
COMMUNITY
Start: 2025-02-20

## 2025-03-04 RX ORDER — PREDNISONE 20 MG/1
40 TABLET ORAL DAILY
Qty: 10 TABLET | Refills: 0 | Status: SHIPPED | OUTPATIENT
Start: 2025-03-04 | End: 2025-03-09

## 2025-03-04 NOTE — LETTER
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 He may return to work on 3/6/2025 .    If you have any questions or concerns, please don't hesitate to call.         Sincerely,          Dante Stapleton PA-C        CC: No Recipients

## 2025-03-05 NOTE — ADDENDUM NOTE
Addended by: JORDANA SHEIKH on: 3/4/2025 07:54 PM     Modules accepted: Orders     Recent Labs     05/20/21  0628 05/20/21  1817 05/21/21  0355   SODIUM 138 140 140       · Resolved    · F/u with repeat BMP

## 2025-03-05 NOTE — PROGRESS NOTES
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 Elbow sprain, unspecified laterality, initial encounter              Patient Instructions     Elbow sprain  Prednisone 40 mg daily x 5 days  Follow up with PCP in 3-5 days.  Proceed to  ER if symptoms worsen.    Chief Complaint     Chief Complaint   Patient presents with   • Elbow Pain     Pt. C/O pain in B/L elbow joints that occurs after lifting weights.          History of Present Illness       24-year-old male who presents complaining of bilateral elbow pain.  Patient states that the pain started after repetitively lifting objects at work that weighed approximately 40 pounds.  Denies fall, rashes, direct trauma.    Elbow Pain  Associated symptoms include arthralgias. Pertinent negatives include no chest pain or coughing.       Review of Systems   Review of Systems   Constitutional: Negative.    HENT: Negative.     Eyes: Negative.    Respiratory: Negative.  Negative for apnea, cough, choking, chest tightness, shortness of breath, wheezing and stridor.    Cardiovascular: Negative.  Negative for chest pain.   Musculoskeletal:  Positive for arthralgias.         Current Medications       Current Outpatient Medications:   •  dexmethylphenidate (FOCALIN XR) 10 MG 24 hr capsule, Take 10 mg by mouth daily, Disp: , Rfl:   •  ketoconazole (NIZORAL) 2 % cream, APPLY 1 GRAM TOPICALLY EVERY DAY, Disp: , Rfl:   •  terbinafine (LamISIL) 250 mg tablet, Take 1 tablet by mouth in the morning, Disp: , Rfl:   •  ibuprofen (MOTRIN) 600 mg tablet, Take 1 tablet (600 mg total) by mouth every 6 (six) hours as needed for mild pain for up to 15 days (Patient not taking: Reported on 3/4/2025), Disp: 15 tablet, Rfl: 0  •  methocarbamol (ROBAXIN) 500 mg tablet, Take 1 tablet (500 mg total) by mouth 4 (four)  times a day for 5 days (Patient not taking: Reported on 3/4/2025), Disp: 20 tablet, Rfl: 0  •  methylPREDNISolone 4 MG tablet therapy pack, Use as directed on package (Patient not taking: Reported on 3/4/2025), Disp: 21 tablet, Rfl: 0  •  naproxen (NAPROSYN) 500 mg tablet, Take 1 tablet (500 mg total) by mouth 2 (two) times a day with meals for 10 days (Patient not taking: Reported on 3/4/2025), Disp: 20 tablet, Rfl: 0  •  VYVANSE 40 MG capsule, TAKE 1 CAPSULE BY MOUTH EVERY DAY IN THE MORNING (Patient not taking: Reported on 3/4/2025), Disp: , Rfl: 0  •  Vyvanse 50 MG capsule, TAKE 1 CAPSULE BY MOUTH EVERY DAY IN THE MORNING (Patient not taking: Reported on 3/4/2025), Disp: , Rfl:     Current Allergies     Allergies as of 03/04/2025   • (No Known Allergies)            The following portions of the patient's history were reviewed and updated as appropriate: allergies, current medications, past family history, past medical history, past social history, past surgical history and problem list.     Past Medical History:   Diagnosis Date   • ADHD    • Ankle fracture, right 07/28/2018       Past Surgical History:   Procedure Laterality Date   • HERNIA REPAIR         Family History   Problem Relation Age of Onset   • No Known Problems Mother    • No Known Problems Father          Medications have been verified.        Objective   /82   Pulse 72   Temp 97.5 °F (36.4 °C)   Resp 18   Wt 95.3 kg (210 lb)   SpO2 96%   BMI 28.48 kg/m²        Physical Exam     Physical Exam  Constitutional:       General: He is not in acute distress.     Appearance: Normal appearance. He is well-developed. He is not diaphoretic.   HENT:      Head: Normocephalic and atraumatic.   Cardiovascular:      Rate and Rhythm: Normal rate and regular rhythm.      Heart sounds: Normal heart sounds.   Pulmonary:      Effort: Pulmonary effort is normal. No respiratory distress.      Breath sounds: Normal breath sounds. No wheezing or rales.    Chest:      Chest wall: No tenderness.   Musculoskeletal:        Arms:       Cervical back: Normal range of motion and neck supple.   Lymphadenopathy:      Cervical: No cervical adenopathy.   Neurological:      Mental Status: He is alert.

## 2025-03-05 NOTE — PATIENT INSTRUCTIONS
Elbow sprain  Prednisone 40 mg daily x 5 days  Follow up with PCP in 3-5 days.  Proceed to  ER if symptoms worsen.

## 2025-06-02 ENCOUNTER — OFFICE VISIT (OUTPATIENT)
Dept: URGENT CARE | Facility: MEDICAL CENTER | Age: 25
End: 2025-06-02
Payer: COMMERCIAL

## 2025-06-02 ENCOUNTER — APPOINTMENT (OUTPATIENT)
Dept: RADIOLOGY | Facility: MEDICAL CENTER | Age: 25
End: 2025-06-02
Attending: PHYSICIAN ASSISTANT
Payer: COMMERCIAL

## 2025-06-02 VITALS
RESPIRATION RATE: 20 BRPM | HEART RATE: 70 BPM | DIASTOLIC BLOOD PRESSURE: 79 MMHG | TEMPERATURE: 98 F | OXYGEN SATURATION: 98 % | SYSTOLIC BLOOD PRESSURE: 120 MMHG

## 2025-06-02 DIAGNOSIS — S89.91XA LEG INJURY, RIGHT, INITIAL ENCOUNTER: ICD-10-CM

## 2025-06-02 DIAGNOSIS — S70.10XA CONTUSION OF THIGH, UNSPECIFIED LATERALITY, INITIAL ENCOUNTER: Primary | ICD-10-CM

## 2025-06-02 PROCEDURE — 99214 OFFICE O/P EST MOD 30 MIN: CPT | Performed by: PHYSICIAN ASSISTANT

## 2025-06-02 PROCEDURE — 73552 X-RAY EXAM OF FEMUR 2/>: CPT

## 2025-06-02 RX ORDER — METHOCARBAMOL 500 MG/1
500 TABLET, FILM COATED ORAL 4 TIMES DAILY
Qty: 20 TABLET | Refills: 0 | Status: SHIPPED | OUTPATIENT
Start: 2025-06-02 | End: 2025-06-07

## 2025-06-02 NOTE — PROGRESS NOTES
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 Leg injury, right, initial encounter  XR femur 2 vw right            Patient Instructions     Contusion right thigh  Robaxin as directed-may become drowsy  If symptoms worsen go directly to the emergency room  Follow up with PCP in 3-5 days.  Proceed to  ER if symptoms worsen.    Chief Complaint     Chief Complaint   Patient presents with    Leg Pain    horse injury     Patient states that approximately an hour ago he was cleaning hooves of a horse where the hind legs of the horse kicked patient into the right thigh; concerned about internal bleeding; has pain in right anterior thigh          History of Present Illness       24-year-old male who presents complaining of pain and swelling to the right thigh after having been kicked by the horse.  Patient states that he was cleaning of when the horse kicked him.  Denies head trauma, loss of consciousness.    Leg Pain         Review of Systems   Review of Systems   Constitutional: Negative.    HENT: Negative.     Eyes: Negative.    Respiratory: Negative.  Negative for apnea, cough, choking, chest tightness, shortness of breath, wheezing and stridor.    Cardiovascular: Negative.  Negative for chest pain.   Musculoskeletal:  Positive for arthralgias.         Current Medications     Current Medications[1]    Current Allergies     Allergies as of 2025    (No Known Allergies)            The following portions of the patient's history were reviewed and updated as appropriate: allergies, current medications, past family history, past medical history, past social history, past surgical history and problem list.     Past Medical History[2]    Past Surgical History[3]    Family History[4]      Medications have been verified.        Objective   /79   Pulse  70   Temp 98 °F (36.7 °C)   Resp 20   SpO2 98%        Physical Exam     Physical Exam  Constitutional:       General: He is not in acute distress.     Appearance: Normal appearance. He is well-developed. He is not diaphoretic.   HENT:      Head: Normocephalic and atraumatic.     Cardiovascular:      Rate and Rhythm: Normal rate and regular rhythm.      Heart sounds: Normal heart sounds.   Pulmonary:      Effort: Pulmonary effort is normal. No respiratory distress.      Breath sounds: Normal breath sounds. No wheezing or rales.   Chest:      Chest wall: No tenderness.     Musculoskeletal:      Cervical back: Normal range of motion and neck supple.        Legs:    Lymphadenopathy:      Cervical: No cervical adenopathy.     Neurological:      Mental Status: He is alert.                        [1]   Current Outpatient Medications:     dexmethylphenidate (FOCALIN XR) 10 MG 24 hr capsule, Take 10 mg by mouth daily, Disp: , Rfl:     ibuprofen (MOTRIN) 600 mg tablet, Take 1 tablet (600 mg total) by mouth every 6 (six) hours as needed for mild pain for up to 15 days (Patient not taking: Reported on 3/4/2025), Disp: 15 tablet, Rfl: 0    ketoconazole (NIZORAL) 2 % cream, APPLY 1 GRAM TOPICALLY EVERY DAY, Disp: , Rfl:     methocarbamol (ROBAXIN) 500 mg tablet, Take 1 tablet (500 mg total) by mouth 4 (four) times a day for 5 days (Patient not taking: Reported on 3/4/2025), Disp: 20 tablet, Rfl: 0    methylPREDNISolone 4 MG tablet therapy pack, Use as directed on package (Patient not taking: Reported on 3/4/2025), Disp: 21 tablet, Rfl: 0    naproxen (NAPROSYN) 500 mg tablet, Take 1 tablet (500 mg total) by mouth 2 (two) times a day with meals for 10 days (Patient not taking: Reported on 3/4/2025), Disp: 20 tablet, Rfl: 0    terbinafine (LamISIL) 250 mg tablet, Take 1 tablet by mouth in the morning, Disp: , Rfl:     VYVANSE 40 MG capsule, TAKE 1 CAPSULE BY MOUTH EVERY DAY IN THE MORNING (Patient not taking: Reported on  3/4/2025), Disp: , Rfl: 0    Vyvanse 50 MG capsule, TAKE 1 CAPSULE BY MOUTH EVERY DAY IN THE MORNING (Patient not taking: Reported on 3/4/2025), Disp: , Rfl:   [2]   Past Medical History:  Diagnosis Date    ADHD     Ankle fracture, right 07/28/2018   [3]   Past Surgical History:  Procedure Laterality Date    HERNIA REPAIR     [4]   Family History  Problem Relation Name Age of Onset    No Known Problems Mother      No Known Problems Father

## 2025-06-02 NOTE — PATIENT INSTRUCTIONS
Contusion right thigh  Robaxin as directed-may become drowsy  If symptoms worsen go directly to the emergency room  Follow up with PCP in 3-5 days.  Proceed to  ER if symptoms worsen.

## 2025-06-02 NOTE — LETTER
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 He may return to work on 6/5/2025.    If you have any questions or concerns, please don't hesitate to call.         Sincerely,          Dante Stapleton PA-C        CC: No Recipients